# Patient Record
Sex: MALE | Race: WHITE
[De-identification: names, ages, dates, MRNs, and addresses within clinical notes are randomized per-mention and may not be internally consistent; named-entity substitution may affect disease eponyms.]

---

## 2018-07-24 ENCOUNTER — HOSPITAL ENCOUNTER (EMERGENCY)
Dept: HOSPITAL 25 - ED | Age: 48
Discharge: HOME | End: 2018-07-24
Payer: COMMERCIAL

## 2018-07-24 VITALS — DIASTOLIC BLOOD PRESSURE: 78 MMHG | SYSTOLIC BLOOD PRESSURE: 125 MMHG

## 2018-07-24 DIAGNOSIS — Z88.3: ICD-10-CM

## 2018-07-24 DIAGNOSIS — R55: Primary | ICD-10-CM

## 2018-07-24 DIAGNOSIS — Z86.73: ICD-10-CM

## 2018-07-24 LAB
BASOPHILS # BLD AUTO: 0 10^3/UL (ref 0–0.2)
EOSINOPHIL # BLD AUTO: 0.1 10^3/UL (ref 0–0.6)
HCT VFR BLD AUTO: 36 % (ref 42–52)
HGB BLD-MCNC: 12.4 G/DL (ref 14–18)
LYMPHOCYTES # BLD AUTO: 1 10^3/UL (ref 1–4.8)
MCH RBC QN AUTO: 29 PG (ref 27–31)
MCHC RBC AUTO-ENTMCNC: 35 G/DL (ref 31–36)
MCV RBC AUTO: 83 FL (ref 80–94)
MONOCYTES # BLD AUTO: 0.7 10^3/UL (ref 0–0.8)
NEUTROPHILS # BLD AUTO: 6.6 10^3/UL (ref 1.5–7.7)
NRBC # BLD AUTO: 0 10^3/UL
NRBC BLD QL AUTO: 0
PLATELET # BLD AUTO: 167 10^3/UL (ref 150–450)
RBC # BLD AUTO: 4.32 10^6/UL (ref 4–5.4)
WBC # BLD AUTO: 8.4 10^3/UL (ref 3.5–10.8)

## 2018-07-24 PROCEDURE — 80053 COMPREHEN METABOLIC PANEL: CPT

## 2018-07-24 PROCEDURE — 86141 C-REACTIVE PROTEIN HS: CPT

## 2018-07-24 PROCEDURE — 93005 ELECTROCARDIOGRAM TRACING: CPT

## 2018-07-24 PROCEDURE — 83605 ASSAY OF LACTIC ACID: CPT

## 2018-07-24 PROCEDURE — 81003 URINALYSIS AUTO W/O SCOPE: CPT

## 2018-07-24 PROCEDURE — 85652 RBC SED RATE AUTOMATED: CPT

## 2018-07-24 PROCEDURE — 84443 ASSAY THYROID STIM HORMONE: CPT

## 2018-07-24 PROCEDURE — 84484 ASSAY OF TROPONIN QUANT: CPT

## 2018-07-24 PROCEDURE — 36415 COLL VENOUS BLD VENIPUNCTURE: CPT

## 2018-07-24 PROCEDURE — 85025 COMPLETE CBC W/AUTO DIFF WBC: CPT

## 2018-07-24 PROCEDURE — 99283 EMERGENCY DEPT VISIT LOW MDM: CPT

## 2018-07-24 PROCEDURE — 71045 X-RAY EXAM CHEST 1 VIEW: CPT

## 2018-07-24 PROCEDURE — 83735 ASSAY OF MAGNESIUM: CPT

## 2018-07-24 PROCEDURE — 96374 THER/PROPH/DIAG INJ IV PUSH: CPT

## 2018-07-24 NOTE — ED
Progress





- Progress Note


Progress Note: 


This patient was signed out from Dr. Lora to Dr. Siegel awaiting re-eval. 


Re-eval was done at 21:39. The patient is feeling better and will be discharged 

home with dx syncope and instructions to follow up with his dermatologist for 

his knee and his PCP.





Re-Evaluation





- Re-Evaluation


  ** First Eval


Re-Evaluation Time: 18:57


Change: Improved - Right knee is mildly warm. 2 liters of fluid pending. He 

feels better. Plan for re-eval by Dr. Siegel





Course/Dx





- Diagnoses


Provider Diagnoses: 


 Syncope








Discharge





- Sign-Out/Discharge


Documenting (check all that apply): Patient Departure





- Discharge Plan


Condition: Stable


Disposition: HOME


Patient Education Materials:  Syncope (ED)


Referrals: 


Tobias Cooper MD [Primary Care Provider] -  (Follow up with your 

dermatologist for your knee and your primary care physician in 1-3 days.)


Additional Instructions: 


RETURN TO THE EMERGENCY DEPARTMENT FOR CHANGING OR WORSENING SYMPTOMS.

## 2018-07-24 NOTE — RAD
INDICATION:  Syncope.



COMPARISON:  Comparison is made with prior chest x-ray study from November 21, 2016.



TECHNIQUE: 2 portable films of the chest were obtained.



FINDINGS: Cardiac and mediastinal contours appear to be within normal limits.



The lungs are clear. No pleural effusion is seen.



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

prosthesis.



IMPRESSION:  NO EVIDENCE FOR ACUTE DISEASE.
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
## 2018-07-28 ENCOUNTER — HOSPITAL ENCOUNTER (INPATIENT)
Dept: HOSPITAL 25 - ED | Age: 48
LOS: 2 days | Discharge: HOME | DRG: 313 | End: 2018-07-30
Attending: HOSPITALIST | Admitting: INTERNAL MEDICINE
Payer: COMMERCIAL

## 2018-07-28 DIAGNOSIS — F41.9: ICD-10-CM

## 2018-07-28 DIAGNOSIS — I10: ICD-10-CM

## 2018-07-28 DIAGNOSIS — Z80.0: ICD-10-CM

## 2018-07-28 DIAGNOSIS — M00.061: Primary | ICD-10-CM

## 2018-07-28 DIAGNOSIS — Z88.2: ICD-10-CM

## 2018-07-28 DIAGNOSIS — G43.909: ICD-10-CM

## 2018-07-28 DIAGNOSIS — Z96.619: ICD-10-CM

## 2018-07-28 DIAGNOSIS — Z88.6: ICD-10-CM

## 2018-07-28 DIAGNOSIS — B95.62: ICD-10-CM

## 2018-07-28 DIAGNOSIS — F17.220: ICD-10-CM

## 2018-07-28 DIAGNOSIS — L40.50: ICD-10-CM

## 2018-07-28 DIAGNOSIS — I73.00: ICD-10-CM

## 2018-07-28 DIAGNOSIS — D84.9: ICD-10-CM

## 2018-07-28 DIAGNOSIS — Z79.899: ICD-10-CM

## 2018-07-28 DIAGNOSIS — G47.00: ICD-10-CM

## 2018-07-28 DIAGNOSIS — K21.9: ICD-10-CM

## 2018-07-28 DIAGNOSIS — Z82.49: ICD-10-CM

## 2018-07-28 DIAGNOSIS — L40.9: ICD-10-CM

## 2018-07-28 LAB
BASOPHILS # BLD AUTO: 0.1 10^3/UL (ref 0–0.2)
EOSINOPHIL # BLD AUTO: 0.3 10^3/UL (ref 0–0.6)
HCT VFR BLD AUTO: 37 % (ref 42–52)
HGB BLD-MCNC: 12.8 G/DL (ref 14–18)
LYMPHOCYTES # BLD AUTO: 1.2 10^3/UL (ref 1–4.8)
MCH RBC QN AUTO: 29 PG (ref 27–31)
MCHC RBC AUTO-ENTMCNC: 34 G/DL (ref 31–36)
MCV RBC AUTO: 84 FL (ref 80–94)
MONOCYTES # BLD AUTO: 0.4 10^3/UL (ref 0–0.8)
NEUTROPHILS # BLD AUTO: 3.4 10^3/UL (ref 1.5–7.7)
NRBC # BLD AUTO: 0 10^3/UL
NRBC BLD QL AUTO: 0
PLATELET # BLD AUTO: 234 10^3/UL (ref 150–450)
RBC # BLD AUTO: 4.42 10^6/UL (ref 4–5.4)
URATE SERPL-MCNC: 4.6 MG/DL (ref 4.4–7.6)
WBC # BLD AUTO: 5.4 10^3/UL (ref 3.5–10.8)

## 2018-07-28 PROCEDURE — 36415 COLL VENOUS BLD VENIPUNCTURE: CPT

## 2018-07-28 PROCEDURE — 84520 ASSAY OF UREA NITROGEN: CPT

## 2018-07-28 PROCEDURE — C1751 CATH, INF, PER/CENT/MIDLINE: HCPCS

## 2018-07-28 PROCEDURE — 89051 BODY FLUID CELL COUNT: CPT

## 2018-07-28 PROCEDURE — 87640 STAPH A DNA AMP PROBE: CPT

## 2018-07-28 PROCEDURE — 0S9C4ZZ DRAINAGE OF RIGHT KNEE JOINT, PERCUTANEOUS ENDOSCOPIC APPROACH: ICD-10-PCS | Performed by: ORTHOPAEDIC SURGERY

## 2018-07-28 PROCEDURE — 87641 MR-STAPH DNA AMP PROBE: CPT

## 2018-07-28 PROCEDURE — 87205 SMEAR GRAM STAIN: CPT

## 2018-07-28 PROCEDURE — 87073 CULTURE BACTERIA ANAEROBIC: CPT

## 2018-07-28 PROCEDURE — 87186 SC STD MICRODIL/AGAR DIL: CPT

## 2018-07-28 PROCEDURE — 87070 CULTURE OTHR SPECIMN AEROBIC: CPT

## 2018-07-28 PROCEDURE — 89060 EXAM SYNOVIAL FLUID CRYSTALS: CPT

## 2018-07-28 PROCEDURE — 81015 MICROSCOPIC EXAM OF URINE: CPT

## 2018-07-28 PROCEDURE — 87077 CULTURE AEROBIC IDENTIFY: CPT

## 2018-07-28 PROCEDURE — 80202 ASSAY OF VANCOMYCIN: CPT

## 2018-07-28 PROCEDURE — 85025 COMPLETE CBC W/AUTO DIFF WBC: CPT

## 2018-07-28 PROCEDURE — 99284 EMERGENCY DEPT VISIT MOD MDM: CPT

## 2018-07-28 PROCEDURE — 81003 URINALYSIS AUTO W/O SCOPE: CPT

## 2018-07-28 PROCEDURE — 84550 ASSAY OF BLOOD/URIC ACID: CPT

## 2018-07-28 PROCEDURE — 83605 ASSAY OF LACTIC ACID: CPT

## 2018-07-28 PROCEDURE — 80048 BASIC METABOLIC PNL TOTAL CA: CPT

## 2018-07-28 PROCEDURE — 0SBC4ZZ EXCISION OF RIGHT KNEE JOINT, PERCUTANEOUS ENDOSCOPIC APPROACH: ICD-10-PCS | Performed by: ORTHOPAEDIC SURGERY

## 2018-07-28 PROCEDURE — 87798 DETECT AGENT NOS DNA AMP: CPT

## 2018-07-28 PROCEDURE — 87040 BLOOD CULTURE FOR BACTERIA: CPT

## 2018-07-28 PROCEDURE — 87476 LYME DIS DNA AMP PROBE: CPT

## 2018-07-28 PROCEDURE — 84145 PROCALCITONIN (PCT): CPT

## 2018-07-28 PROCEDURE — 85652 RBC SED RATE AUTOMATED: CPT

## 2018-07-28 PROCEDURE — 80053 COMPREHEN METABOLIC PANEL: CPT

## 2018-07-28 PROCEDURE — 86140 C-REACTIVE PROTEIN: CPT

## 2018-07-28 PROCEDURE — 82565 ASSAY OF CREATININE: CPT

## 2018-07-28 PROCEDURE — 83735 ASSAY OF MAGNESIUM: CPT

## 2018-07-28 RX ADMIN — HEPARIN SODIUM SCH: 5000 INJECTION INTRAVENOUS; SUBCUTANEOUS at 21:35

## 2018-07-28 NOTE — XMS REPORT
Yousuf Wagoner

 Created on:2018



Patient:Yousuf Wagoner

Sex:Male

:1970

External Reference #:[id number]





Demographics







 Address  [address] [place] [address]

 

 Home Phone  [phone]

 

 Mobile Phone  [phone]

 

 Preferred Language  English

 

 Marital Status  Not  Or 

 

 Moravian Affiliation  Unknown

 

 Race  White

 

 Ethnic Group  Not  Or 









Author







 Organization  [facility]

 

 Address  [address]



   [place] [address]

 

 Phone  [phone]









Support







 Name  Relationship  Address  Phone

 

 Kelin Wagoner  Unavailable  Unavailable  [phone]









Care Team Providers







 Name  Role  Phone

 

 Oral Horvath MD  Care Team Information   Unavailable

 

 Tobias Cooper MD  Primary Care Physician  Unavailable









Payers







 Type  Date  Identification Numbers  Payment Provider  Subscriber

 

 Commercial    Policy Number: [id number]  [employer]  Kelin Wagoner









 PayID: [id number]  [address] [id number]









 [place] [address]







Problems







 Date  Description  Provider  Status

 

 Onset: 2018  Cellulitis of right lower limb  Haley Landry M.D.  Active

 

 Onset: 2018  Localized, primary osteoarthritis  Haley Landry M.D.  
Active

 

 Onset: 2018  Psoriasis with arthropathy  Haley Landry M.D.  Active







Family History







 Date  Family Member(s)  Problem(s)  Comments

 

   General  Arthritis, Osteo  

 

   General  Osteoarthritis  

 

   General  Brother with undifferentiated spondyloarthropathy  

 

   General  Hypertension  







Social History







 Type  Date  Description  Comments

 

 Lives With    Spouse  

 

 Occupation    [employer]  

 

 ETOH Use    Denies alcohol use  

 

 Smoking    Patient has never smoked  

 

 Smoking    chews tobacco  

 

 Exercise Type/Frequency    Exercises rarely  







Allergies, Adverse Reactions, Alerts







 Date  Description  Reaction  Status  Severity  Comments

 

 2017  Tylenol    active    heartburn/burning sensation

 

 2017  Aspirin    active    heartburn/burning sensation

 

 2018  Sulfa Antibiotics  rash  active    

 

 2018  Tape    active    







Medications







 Medication  Date  Status  Form  Strength  Qnty  SIG  Indications  Ordering



                 Provider

 

 Doxycycline  /  Active  Capsules  100mg  45cap  1 by mouth    German Cortez  2018        s  twice a day,    darlin Cox M.D.



             Methotrexate    



             and Taltz    

 

 Meloxicam  /  Active  Tablets  15mg  30tab  1 by mouth  M25.561  Haley



   2018        s  every day    AMAURY Landry

 

 Hydrocodone  /  Active  Tablets  5-300mg  60tab  1-2 tablets  M25.561  
Haley



 Bitartrate/Ace  2018        s  q12 hours by    soha Landryinophen            mouth as    M.NIKI



             needed pain    

 

 Cephalexin  /  Active  Tablets  500mg  30tab  1 by mouth  L03.115  Haley



   2018        s  four times a    chris Landry M.D.

 

 Potassium  /  Active  Tablets ER  20Meq  6tabs  1 tab po bid  E87.6  
Haley



 Chloride ER  2018          x 3 days    Frantz,



             **recheck    M.D.



             potassium    



             wtih pcp in 1    



             week    

 

 Taltz  /  Active  Solution  80mg/ml  8unit  160 mg (two    2018    Auto-Injec    s  80 mg    Brooke,



       t      injections)    MBALDEMAR



             at week 0,    



             followed by    



             80 mg at    



             weeks 2, 4,    



             6, 8, 10, and    



             12, then 80    



             mg every 4    



             weeks.    

 

 Methotrexate  /  Active  Tablets  2.5mg  30tab  take 5  R70.0  2018        s  capsules/tabl    Brooke,



             ets by mouth    M.DDeidra



             once weekly    

 

 Folic Acid  /  Active  Tablets  1mg  90tab  take one  R70.0  2018        s  capsule/table    Brooke,



             t daily by    MBALDEMAR



             mouth    

 

 Celebrex  /  Active  Capsules  200mg  90cap  take one  M06.4  2017        s  capsule/table    Brooke,



             t daily by    M.NIKI



             mouth as    



             needed for    



             pain    

 

 Nitro-bid  /  Active  Ointment  2%  30gm  apply small  I73.00  2017          amount to    xiang Cox of    M.D.



             digits as    



             needed for    



             attack of    



             raynaud's    

 

 D3-1000  /  Active  Tablets  1000Unit  90tab  take one  M06.4  2017        s  capsule/table    Brooke,



             t daily by    M.NIKI



             mouth    

 

 Venlafaxine  /  Active  Caps ER  150mg    1 by mouth    Unknown



 HCL ER  0000    24HR      every day    

 

 Pantoprazole  00//  Active  Tablets DR  40mg    1 by mouth    Unknown



 Sodium  0000          every day    

 

 Losartan  00/  Active  Tablets  100mg    1 by mouth    Unknown



 Potassium  0000          every day    

 

 Amlodipine  00/  Active  Tablets  5mg    1 by mouth    Unknown



 Besylate  0000          every day    

 

 Trazodone HCL  /  Active  Tablets  50mg    1 tablet at    Unknown



   0000          bedtime as    



             needed    

 

 Alprazolam  /  Active  Tablets  0.5mg        Unknown



   0000              

 

 Rizatriptan  /  Active  Tablets  10mg    prn    Unknown



 Benzoate  0000    Dispers          

 

 Aspir-81  /  Active  Tablets DR  81mg    1 by mouth    Unknown



   0000          every day    

 

                 

 

 Clopidogrel  /  Hx  Tablets  75mg    1 by mouth    Unknown



 Bisulfate  0000 -          every day    



   2018              







Immunizations







 CPT Code  Status  Date  Vaccine  Reaction  Lot #

 

 [id number]  Given  2018  Pneumococcal Conjugate  no immediate reaction  [id number]



       Vaccine 13 Valent For  noted  



       Intramuscular Use    







Vital Signs







 Date  Vital  Result  Comment

 

 2018  Height  70 inches  5'10"









 Weight  235.00 lb  

 

 Heart Rate  88 /min  

 

 BP Systolic  126 mmHg  

 

 BP Diastolic  84 mmHg  

 

 BMI (Body Mass Index)  33.7 kg/m2  









 2018  Height  70 inches  5'10"









 Heart Rate  97 /min  

 

 BP Systolic Sitting  124 mmHg  

 

 BP Diastolic Sitting  73 mmHg  

 

 Respiratory Rate  14 /min  

 

 Body Temperature  99.0 F  

 

 Pain Level  8  









 2018  Height  70 inches  5'10"









 Weight  239.12 lb  

 

 Heart Rate  73 /min  

 

 BP Systolic Sitting  130 mmHg  

 

 BP Diastolic Sitting  82 mmHg  

 

 Respiratory Rate  14 /min  

 

 Pain Level  5  

 

 BMI (Body Mass Index)  34.3 kg/m2  









 2018  Height  70 inches  5'10"









 Weight  239.00 lb  

 

 Heart Rate  84 /min  

 

 BP Systolic Sitting  120 mmHg  

 

 BP Diastolic Sitting  80 mmHg  

 

 Respiratory Rate  14 /min  

 

 Body Temperature  97.6 F  

 

 Pain Level  5  

 

 BMI (Body Mass Index)  34.3 kg/m2  









 2017  Height  70 inches  5'10"









 Weight  237.25 lb  

 

 Heart Rate  80 /min  

 

 BP Systolic Sitting  118 mmHg  

 

 BP Diastolic Sitting  78 mmHg  

 

 Respiratory Rate  14 /min  

 

 Pain Level  3  

 

 BMI (Body Mass Index)  34.0 kg/m2  









 2017  Height  70 inches  5'10"









 Weight  238.00 lb  

 

 Heart Rate  89 /min  

 

 BP Systolic Sitting  101 mmHg  

 

 BP Diastolic Sitting  73 mmHg  

 

 Respiratory Rate  14 /min  

 

 Body Temperature  97.1 F  

 

 Pain Level  4  

 

 BMI (Body Mass Index)  34.1 kg/m2  









 2017  Height  70 inches  5'10"









 Weight  242.00 lb  

 

 Heart Rate  68 /min  

 

 BP Systolic Sitting  122 mmHg  

 

 BP Diastolic Sitting  80 mmHg  

 

 Respiratory Rate  14 /min  

 

 Body Temperature  97.4 F  

 

 Pain Level  3  

 

 BMI (Body Mass Index)  34.7 kg/m2  







Results







 Test  Date  Test  Result  H/L  Range  Note

 

 Quantiferon Gold TB  2018  QuantiFERON-Tb Gold Plus  Negative    
Negative  1









 TB1 Ag minus Nil Result  0 IU/mL      

 

 TB2 Ag minus Nil Result  0 IU/mL      

 

 TB Mitogen minus Nil Result  9.05 IU/mL      

 

 TB Nil Result  0.03 IU/mL      2









 Laboratory test finding  2018  C Reactive Protein  2.93 mg/L    <8.01  3

 

 Comp Metabolic Panel  2018  Sodium  138 mmol/L    135-145  









 Potassium  4.2 mmol/L    3.5-5.0  

 

 Chloride  104 mmol/L    101-111  

 

 Co2 Carbon Dioxide  29 mmol/L    22-32  

 

 Anion Gap  5 mmol/L    2-11  

 

 Glucose  103 mg/dL  High    

 

 Blood Urea Nitrogen  21 mg/dL    6-24  

 

 Creatinine  1.11 mg/dL    0.67-1.17  

 

 BUN/Creatinine Ratio  18.9    8-20  

 

 Calcium  9.2 mg/dL    8.6-10.3  

 

 Total Protein  6.7 g/dL    6.4-8.9  

 

 Albumin  4.3 g/dL    3.2-5.2  

 

 Globulin  2.4 g/dL    2-4  

 

 Albumin/Globulin Ratio  1.8    1-3  

 

 Total Bilirubin  0.40 mg/dL    0.2-1.0  

 

 Alkaline Phosphatase  76 U/L      

 

 Alt  16 U/L    7-52  

 

 Ast  15 U/L    13-39  

 

 Egfr Non-  70.4    >60  

 

 Egfr   85.2    >60  4









 Laboratory test finding  2018  Erythrocyte Sed Rate  7 mm/Hr    0-14  

 

 CBC Auto Diff  2018  White Blood Count  7.0 10^3/uL    3.5-10.8  









 Red Blood Count  4.95 10^6/uL    4.00-5.40  

 

 Hemoglobin  14.4 g/dL    14.0-18.0  

 

 Hematocrit  41 %  Low  42-52  

 

 Mean Corpuscular Volume  83 fL    80-94  

 

 Mean Corpuscular Hemoglobin  29 pg    27-31  

 

 Mean Corpuscular HGB Conc  35 g/dL    31-36  

 

 Red Cell Distribution Width  14 %    10.5-15  

 

 Platelet Count  212 10^3/uL    150-450  

 

 Mean Platelet Volume  7.5 um3    7.4-10.4  

 

 Abs Neutrophils  4.3 10^3/uL    1.5-7.7  

 

 Abs Lymphocytes  1.7 10^3/uL    1.0-4.8  

 

 Abs Monocytes  0.6 10^3/uL    0-0.8  

 

 Abs Eosinophils  0.2 10^3/uL    0-0.6  

 

 Abs Basophils  0.1 10^3/uL    0-0.2  

 

 Abs Nucleated RBC  0 10^3/uL      

 

 Granulocyte %  62.4 %    38-83  

 

 Lymphocyte %  25.0 %    25-47  

 

 Monocyte %  8.3 %  High  0-7  

 

 Eosinophil %  3.3 %    0-6  

 

 Basophil %  1.0 %    0-2  

 

 Nucleated Red Blood Cells %  0      









 Laboratory test finding  2018  Erythrocyte Sed Rate  10 mm/Hr    0-14  5









 C Reactive Protein  6.40 mg/L  High  < 5.00  6

 

 Vitamin D, 1,25 Dihydroxy  52 pg/mL    18-64  7









 Comp Metabolic Panel  2018  Sodium  138 mmol/L  Low  139-145  









 Potassium  3.9 mmol/L    3.5-5.0  

 

 Chloride  104 mmol/L    101-111  

 

 Co2 Carbon Dioxide  27 mmol/L    22-32  

 

 Anion Gap  7 mmol/L    2-11  

 

 Glucose  130 mg/dL  High    

 

 Blood Urea Nitrogen  22 mg/dL    6-24  

 

 Creatinine  1.01 mg/dL    0.67-1.17  

 

 BUN/Creatinine Ratio  21.8  High  8-20  

 

 Calcium  9.2 mg/dL    8.6-10.3  

 

 Total Protein  6.3 g/dL  Low  6.4-8.9  

 

 Albumin  4.0 g/dL    3.2-5.2  

 

 Globulin  2.3 g/dL    2-4  

 

 Albumin/Globulin Ratio  1.7    1-3  

 

 Total Bilirubin  0.40 mg/dL    0.2-1.0  

 

 Alkaline Phosphatase  96 U/L      

 

 Alt  17 U/L    7-52  

 

 Ast  18 U/L    13-39  

 

 Egfr Non-  78.5    >60  

 

 Egfr   101.0    >60  8









 CBC Auto Diff  2018  White Blood Count  5.6 10^3/uL    3.5-10.8  









 Red Blood Count  5.00 10^6/uL    4.0-5.4  

 

 Hemoglobin  13.9 g/dL  Low  14.0-18.0  

 

 Hematocrit  41 %  Low  42-52  

 

 Mean Corpuscular Volume  83 fL    80-94  

 

 Mean Corpuscular Hemoglobin  28 pg    27-31  

 

 Mean Corpuscular HGB Conc  34 g/dL    31-36  

 

 Red Cell Distribution Width  14 %    10.5-15  

 

 Platelet Count  225 10^3/uL    150-450  

 

 Mean Platelet Volume  8.2 um3    7.4-10.4  

 

 Abs Neutrophils  3.6 10^3/uL    1.5-7.7  

 

 Abs Lymphocytes  1.4 10^3/uL    1.0-4.8  

 

 Abs Monocytes  0.3 10^3/uL    0-0.8  

 

 Abs Eosinophils  0.2 10^3/uL    0-0.6  

 

 Abs Basophils  0 10^3/uL    0-0.2  

 

 Abs Nucleated RBC  0 10^3/uL      

 

 Granulocyte %  64.5 %    38-83  

 

 Lymphocyte %  25.4 %    25-47  

 

 Monocyte %  5.8 %    0-7  

 

 Eosinophil %  3.7 %    0-6  

 

 Basophil %  0.6 %    0-2  

 

 Nucleated Red Blood Cells %  0.1      









 Comp Metabolic Panel  2017  Sodium  136 mmol/L    133-145  









 Potassium  4.7 mmol/L    3.5-5.0  

 

 Chloride  101 mmol/L    101-111  

 

 Co2 Carbon Dioxide  29 mmol/L    22-32  

 

 Anion Gap  6 mmol/L    2-11  

 

 Glucose  80 mg/dL      

 

 Blood Urea Nitrogen  17 mg/dL    6-24  

 

 Creatinine  1.15 mg/dL    0.67-1.17  

 

 BUN/Creatinine Ratio  14.8    8-20  

 

 Calcium  9.9 mg/dL    8.6-10.3  

 

 Total Protein  7.0 g/dL    6.4-8.9  

 

 Albumin  4.5 g/dL    3.2-5.2  

 

 Globulin  2.5 g/dL    2-4  

 

 Albumin/Globulin Ratio  1.8    1-3  

 

 Total Bilirubin  0.60 mg/dL    0.2-1.0  

 

 Alkaline Phosphatase  80 U/L      

 

 Alt  24 U/L    7-52  

 

 Ast  20 U/L    13-39  

 

 Egfr Non-  67.9    >60  

 

 Egfr   87.3    >60  9









 Laboratory test finding  2017  Erythrocyte Sed Rate  12 mm/Hr    0-14  10









 C Reactive Protein  3.36 mg/L    < 5.00  11









 Vitamin B12 And Folate Serum  2017  Vitamin B12  533 pg/mL    180-914  12









 Folic Acid (Folate)  7.97 ng/mL    >3.99  13









 Iron & Iron Binding Capacity  2017  Iron  97 g/dL      









 Unsaturated Iron Binding  292 g/dL      

 

 Total Iron Binding Capacity  389 g/dL    250-450  

 

 % Iron Saturation  25 %    15-55  









 CBC Auto Diff  2017  White Blood Count  5.0 10^3/uL    3.5-10.8  









 Red Blood Count  5.34 10^6/uL    4.0-5.4  

 

 Hemoglobin  14.1 g/dL    14.0-18.0  

 

 Hematocrit  43 %    42-52  

 

 Mean Corpuscular Volume  81 fL    80-94  

 

 Mean Corpuscular Hemoglobin  26 pg  Low  27-31  

 

 Mean Corpuscular HGB Conc  33 g/dL    31-36  

 

 Red Cell Distribution Width  15 %    10.5-15  

 

 Platelet Count  213 10^3/uL    150-450  

 

 Mean Platelet Volume  8 um3    7.4-10.4  

 

 Abs Neutrophils  3.1 10^3/uL    1.5-7.7  

 

 Abs Lymphocytes  1.4 10^3/uL    1.0-4.8  

 

 Abs Monocytes  0.4 10^3/uL    0-0.8  

 

 Abs Eosinophils  0.1 10^3/uL    0-0.6  

 

 Abs Basophils  0 10^3/uL    0-0.2  

 

 Abs Nucleated RBC  0.01 10^3/uL      

 

 Granulocyte %  61.5 %    38-83  

 

 Lymphocyte %  27.2 %    25-47  

 

 Monocyte %  8.9 %    1-9  

 

 Eosinophil %  1.8 %    0-6  

 

 Basophil %  0.6 %    0-2  

 

 Nucleated Red Blood Cells %  0.1      









 Connective Tissue Panel  2017  Anti-Nuclear Antibody  0.5 U      14









 Cyclic Citrullinated Peptide  <15.6 U      15

 

 Interpretation  See Comment      16









 Laboratory test finding  2017  Rheumatoid Factor  <15 IU/mL    <15  17









 Erythrocyte Sed Rate  23 mm/Hr  High  0-14  18

 

 C Reactive Protein  14.86 mg/L  High  < 5.00  19









 Hla B27  2017  Hla B27  Negative      20









 Hla B27 Interp  See Comment      21









 Laboratory test finding  2017  Lyme Disease Serology  Negative    
Negative  22

 

 Anca AB Ser If  2017  C-Anca  Negative    Negative  









 P-Anca  Negative    Negative  23









 Laboratory test finding  2017  Creatine Kinase(CK)  132 U/L      24

 

 Comp Metabolic Panel  2017  Sodium  139 mmol/L    133-145  









 Potassium  4.3 mmol/L    3.5-5.0  

 

 Chloride  103 mmol/L    101-111  

 

 Co2 Carbon Dioxide  30 mmol/L    22-32  

 

 Anion Gap  6 mmol/L    2-11  

 

 Glucose  99 mg/dL      

 

 Blood Urea Nitrogen  21 mg/dL    6-24  

 

 Creatinine  1.08 mg/dL    0.67-1.17  

 

 BUN/Creatinine Ratio  19.4    8-20  

 

 Calcium  9.4 mg/dL    8.6-10.3  

 

 Total Protein  7.0 g/dL    6.4-8.9  

 

 Albumin  4.2 g/dL    3.2-5.2  

 

 Globulin  2.8 g/dL    2-4  

 

 Albumin/Globulin Ratio  1.5    1-3  

 

 Total Bilirubin  0.40 mg/dL    0.2-1.0  

 

 Alkaline Phosphatase  84 U/L      

 

 Alt  18 U/L    7-52  

 

 Ast  14 U/L    13-39  

 

 Egfr Non-  73.3    >60  

 

 Egfr   94.3    >60  25









 CBC Auto Diff  2017  White Blood Count  5.5 10^3/uL    3.5-10.8  









 Red Blood Count  5.12 10^6/uL    4.0-5.4  

 

 Hemoglobin  13.4 g/dL  Low  14.0-18.0  

 

 Hematocrit  41 %  Low  42-52  

 

 Mean Corpuscular Volume  79 fL  Low  80-94  

 

 Mean Corpuscular Hemoglobin  26 pg  Low  27-31  

 

 Mean Corpuscular HGB Conc  33 g/dL    31-36  

 

 Red Cell Distribution Width  15 %    10.5-15  

 

 Platelet Count  226 10^3/uL    150-450  

 

 Mean Platelet Volume  8 um3    7.4-10.4  

 

 Abs Neutrophils  4.0 10^3/uL    1.5-7.7  

 

 Abs Lymphocytes  1.1 10^3/uL    1.0-4.8  

 

 Abs Monocytes  0.3 10^3/uL    0-0.8  

 

 Abs Eosinophils  0.1 10^3/uL    0-0.6  

 

 Abs Basophils  0 10^3/uL    0-0.2  

 

 Abs Nucleated RBC  0 10^3/uL      

 

 Granulocyte %  72.7 %    38-83  

 

 Lymphocyte %  19.8 %  Low  25-47  

 

 Monocyte %  5.3 %    1-9  

 

 Eosinophil %  1.8 %    0-6  

 

 Basophil %  0.4 %    0-2  

 

 Nucleated Red Blood Cells %  0.1      









 Laboratory test finding  2017  TSH (Thyroid Stim  1.00 mcIU/mL    0.34-
5.60  26



     Horm)        









 Angiotensin Converting Enzyme  27 U/L    8 - 53  27









 Vitamin B12 And Folate Serum  2017  Vitamin B12  531 pg/mL    180-914  28









 Folic Acid (Folate)  5.25 ng/mL    >3.99  29









 Vitamin D 1,25 And Vitamin  2017  Vitamin D Total 25(Oh)  29.6 ng/mL  
Low  30-50  30



 D,2            









 Vitamin D, 1,25 Dihydroxy  56 pg/mL    18-64  31









 1  No interferon-gamma response to M. tuberculosis antigens



   was detected. Infection with M. tuberculosis is unlikely.



   A single negative result does not exclude infection with



   M. tuberculosis. In patients at high risk for M.tuberculosis



   infection, a second test should be considered in accordance



   with the 2017 ATS/IDSA/CDC Clinical Practice Guidelines



   for Diagnosis of Tuberculosis in Adults and Children



   [Lewinsohn DM et. al. Clin. Infect. Dis.



   2017;64(2):111-115].

 

 2  Test Performed by:



   [facility] [place] [address]

 

 3  Please check this week

 

 4  *******Because ethnic data is not always readily available,



   this report includes an eGFR for both -Americans and



   non- Americans.****



   The National Kidney Disease Education Program (NKDEP) does



   not endorse the use of the MDRD equation for patients that



   are not between the ages of 18 and 70, are pregnant, have



   extremes of body size, muscle mass, or nutritional status,



   or are non- or non-.



   According to the National Kidney Foundation, irrespective of



   diagnosis, the stage of the disease is based on the level of



   kidney function:



   Stage Description                      GFR(mL/min/1.73 m(2))



   1     Kidney damage with normal or decreased GFR       90



   2     Kidney damage with mild decrease in GFR          60-89



   3     Moderate decrease in GFR                         30-59



   4     Severe decrease in GFR                           15-29



   5     Kidney failure                       <15 (or dialysis)

 

 5  Please check today

 

 6  Acute inflammation:  >10.00

 

 7  -------------------ADDITIONAL INFORMATION-------------------



   This test was developed and its performance characteristics



   determined by [facility] in a manner consistent with CLIA



   requirements. This test has not been cleared or approved by



   the U.S. Food and Drug Administration.



   Test Performed by:



   [facility] [facility] [place] [address]

 

 8  *******Because ethnic data is not always readily available,



   this report includes an eGFR for both -Americans and



   non- Americans.****



   The National Kidney Disease Education Program (NKDEP) does



   not endorse the use of the MDRD equation for patients that



   are not between the ages of 18 and 70, are pregnant, have



   extremes of body size, muscle mass, or nutritional status,



   or are non- or non-.



   According to the National Kidney Foundation, irrespective of



   diagnosis, the stage of the disease is based on the level of



   kidney function:



   Stage Description                      GFR(mL/min/1.73 m(2))



   1     Kidney damage with normal or decreased GFR       90



   2     Kidney damage with mild decrease in GFR          60-89



   3     Moderate decrease in GFR                         30-59



   4     Severe decrease in GFR                           15-29



   5     Kidney failure                       <15 (or dialysis)

 

 9  *******Because ethnic data is not always readily available,



   this report includes an eGFR for both -Americans and



   non- Americans.****



   The National Kidney Disease Education Program (NKDEP) does



   not endorse the use of the MDRD equation for patients that



   are not between the ages of 18 and 70, are pregnant, have



   extremes of body size, muscle mass, or nutritional status,



   or are non- or non-.



   According to the National Kidney Foundation, irrespective of



   diagnosis, the stage of the disease is based on the level of



   kidney function:



   Stage Description                      GFR(mL/min/1.73 m(2))



   1     Kidney damage with normal or decreased GFR       90



   2     Kidney damage with mild decrease in GFR          60-89



   3     Moderate decrease in GFR                         30-59



   4     Severe decrease in GFR                           15-29



   5     Kidney failure                       <15 (or dialysis)

 

 10  Please check in 1 or 2 weeks or sooner if needed

 

 11  Acute inflammation:  >10.00

 

 12  Normal Range 180 to 914



   Indeterminate Range 145 to 180



   Deficient Range  <145

 

 13  Please check in 1 or 2 weeks or sooner if needed

 

 14  -------------------REFERENCE VALUE--------------------------



   <=1.0 (Negative)

 

 15  -------------------REFERENCE VALUE--------------------------



   <20.0 (Negative)

 

 16  Tests for antibodies to dsDNA and JULISA antigens are not



   performed automatically unless the ALISON result is > or=



   3.0 U.  Studies performed at [facility] indicate that



   positive ALISON results <3.0 U are rarely accompanied by



   positive second order tests.



   Test Performed by:



   [facility] [place] [address]



   : Jeremiah Hernandez II, M.D., Ph.D.

 

 17  Test Performed by:



   [facility] [place] [address]



   : Jeremiah Hernandez II, M.D., Ph.D.

 

 18  Please check today

 

 19  Acute inflammation:  >10.00

 

 20  -------------------REFERENCE VALUE--------------------------



   Not Applicable

 

 21  RESULT: HLA-B27 antigen was not detected.



   -------------------ADDITIONAL INFORMATION-------------------



   Method: Flow Cytometry



   Performing Laboratory CLIA# [id number]



   Test Performed by:



   [place] [address]



   : Jeremiah Hernandez II, M.D., Ph.D.

 

 22  Serologic response to B. burgdorferi infection is not



   detected, but cannot rule out early infection during



   which low or undetectable antibody levels to



   B. burgdorferi may be present. If clinically indicated,



   a new serum specimen should be submitted in 7-14 days.



   Test Performed by:



   [facility] [place] [address]



   : Jeremiah Hernandez II, M.D., Ph.D.

 

 23  Negative for cANCA and pANCA patterns by immunofluorescence.



   -------------------ADDITIONAL INFORMATION-------------------



   This test was developed and its performance characteristics



   determined by [facility] in a manner consistent with CLIA



   requirements. This test has not been cleared or approved by



   the U.S. Food and Drug Administration.



   Test Performed by:



   [place] [address]



   : Jeremiah Hernandez II, M.D., Ph.D.

 

 24  Please check today

 

 25  *******Because ethnic data is not always readily available,



   this report includes an eGFR for both -Americans and



   non- Americans.****



   The National Kidney Disease Education Program (NKDEP) does



   not endorse the use of the MDRD equation for patients that



   are not between the ages of 18 and 70, are pregnant, have



   extremes of body size, muscle mass, or nutritional status,



   or are non- or non-.



   According to the National Kidney Foundation, irrespective of



   diagnosis, the stage of the disease is based on the level of



   kidney function:



   Stage Description                      GFR(mL/min/1.73 m(2))



   1     Kidney damage with normal or decreased GFR       90



   2     Kidney damage with mild decrease in GFR          60-89



   3     Moderate decrease in GFR                         30-59



   4     Severe decrease in GFR                           15-29



   5     Kidney failure                       <15 (or dialysis)

 

 26  Please check today

 

 27  Test Performed by:



   [place] [address]



   : Jeremiah Hernandez II, M.D., Ph.D.

 

 28  Normal Range 180 to 914



   Indeterminate Range 145 to 180



   Deficient Range  <145

 

 29  Please check today

 

 30  Please check today

 

 31  -------------------ADDITIONAL INFORMATION-------------------



   This test was developed and its performance characteristics



   determined by [facility] in a manner consistent with CLIA



   requirements. This test has not been cleared or approved by



   the U.[place]. Food and Drug Administration.



   Test Performed by:



   [facility] - [address] [address]



   : Jeremiah Hernandez II, M.D., Ph.D.







Procedures







 Description

 

 No Information







Encounters







 Type  Date  Location  Provider  CPT E/M  Dx

 

 Office Visit  2018  Orthopedic Services  Haley Landry M.D.  [id number]  
M25.461



   2:00p  [facility]      









 R70.0

 

 L40.50

 

 M17.11

 

 M25.561

 

 L03.115

 

 E87.6









 Office Visit  2018  4:00p  R[facility]ology Services  German Cox,  [id number]  
L40.50



     Of [facility]  M.D.    









 Z79.899

 

 R70.0

 

 D64.9

 

 Z23









 Office Visit  2018 11:00a  Rheumatology Services  German Cox,  [id number]  
L40.50



     Of Jose REBOLLEDO    









 Z79.1

 

 Z79.899

 

 I73.00

 

 R70.0









 Office Visit  2017  9:20a  [facility] Services Of  German Bajwar,  
[id number]  M15.0



     Jose SANCHEZ.NIKI    









 Z79.1

 

 I73.00

 

 M79.1

 

 D64.9

 

 R70.0









 Office Visit  2017  9:20a  [facility] Of  German Cox,  
[id number]  M06.4



     Jose SANCHEZ.NIKI    









 R20.8

 

 M79.1

 

 M54.5

 

 I73.00

 

 Z79.1









 Office Visit  2017  8:00a  [facility] Of  German Bajwar,  
[id number]  M06.4



     Jose SANCHEZ.NIKI    









 R20.8

 

 M79.1

 

 M54.5

 

 M25.559

 

 I73.00

 

 Z79.1







Plan of Care

Future Appointment(s):2018  3:30 pm - Haley Landry M.D. at [facility]2018 10:20 am - German Cox M.D. at [facility] 
[facility] Of [facility]2018 - Haley Landry M.D.M25.461 Effusion, right kneeNew 
Labs:Lyme Disease SerologyUric AcidTick-Borne Panel PCR TcflsH86.0 Elevated 
erythrocyte sedimentation rateNew Labs:Lyme Disease XstvxjfgL03.50 Arthropathic 
psoriasis, sogmoctjpgiY27.11 Unilateral primary osteoarthritis, right 
kneeFollow up:Follow up:    last nzkmtugenzdY32.561 Pain in right kneeNew 
Medication:Meloxicam 15 mgHydrocodone Bitartrate/Acetaminophen 5-300 mgNew Xrays
:Knee 3 Views RTL03.115 Cellulitis of right lower limbNew Medication:Cephalexin 
500 mgE87.6 HypokalemiaNew Medication:Potassium Chloride ER 20 Meq

## 2018-07-28 NOTE — ED
Lower Extremity





- HPI Summary


HPI Summary: 


This is tony Berrysain documenting for attending Dr. Niko Penaloza MD.








A 47 y/o male presents to ED c/o right knee swelling and pain reaching 5/10 in 

severity. Currently, the patient has no other symptoms or medical conditions 

besides the knee swelling and pain. According to the patient, all of a sudden 

he woke with swelling and knee pain on Monday, 07/23/2018, after work this 

earlier in the morning. He noted that since then the pain and swelling has been 

getting worse. He noted that he was referred to [facility] ED by his rheumatologist, 

Dr. Landry, who called Dr. Edge earlier. Dr. Landry thinks the patient has 

cellulitis (initially thought to be Lyme disease). He stated that he has been 

on three different kinds of antibiotics (Keflex first, currently on Clindamycin

) over the past week, however, none of them alleviated his pain/swelling. 

Additionally he noted that he did have during onset of pain/swelling such as 

fever, chills, nausea and headaches, however it has been resolved since meds. 

It was noted that the patient was here on Wednesday where Dr. Cox tried to 

aspirate him in which he had syncope. He stopped breathing and EMS was called 

and brought to [facility] ED for six hours. PMHx of psoriasis and HBP. Major surgeries 

include torn mesenteric artery (no surgery), 7 surgeries on right shoulder, 2 

on right knee (ACL and MCL), left ankle surgery, appendectomy, left wrist 

surgery, left knee surgery, vasectomy. 








- History of Current Complaint


Chief Complaint: EDExtremityLower


Stated Complaint: RT KNEE SWELLING


Time Seen by Provider: 07/28/18 15:06


Hx Obtained From: Patient


Mechanism Of Injury: Unknown


Onset of Pain: Immediate


Onset/Duration: Days - 6 days


Severity Initially: Moderate


Severity Currently: Moderate


Pain Intensity: 5


Pain Scale Used: 0-10 Numeric


Timing: Constant


Location: Is Discrete @ - Right knee


Associated Signs And Symptoms: Positive: Swelling, Redness, Knee Pain


Aggravating Factor(s): Nothing


Alleviating Factor(s): Nothing


Able to Bear Weight: Yes





- Allergies/Home Medications


Allergies/Adverse Reactions: 


 Allergies











Allergy/AdvReac Type Severity Reaction Status Date / Time


 


acetaminophen [From Tylenol] Allergy  GI Upset Verified 07/28/18 15:15


 


aspirin Allergy  Swelling Verified 07/28/18 15:15





   Of  





   Face,Lips,&  





   Throat  


 


ibuprofen AdvReac  GI Upset Verified 07/24/18 17:41


 


migraine medication Allergy Severe See Comment Uncoded 07/24/18 17:41











Home Medications: 


 Home Medications





Amlodipine Besylate 5 mg PO DAILY 07/28/18 [History Confirmed 07/28/18]


Clindamycin Cap 300 mg Cap(NF) 300 mg PO QID 07/28/18 [History Confirmed 07/28/ 18]


Doxy 100 100 mg PO BID 07/28/18 [History Confirmed 07/28/18]


Folic Acid 1 mg PO DAILY 07/28/18 [History Confirmed 07/28/18]


Klor Con ER TAB 10 MEQ* 20 meq PO BID 07/28/18 [History Confirmed 07/28/18]


Meloxicam 15 mg PO DAILY 07/28/18 [History Confirmed 07/28/18]


Trazodone HCl 50 mg PO PRN 07/28/18 [History]











PMH/Surg Hx/FS Hx/Imm Hx


Endocrine/Hematology History: 


   Denies: Hx Diabetes, Hx Thyroid Disease


Cardiovascular History: Reports: Hx Hypertension


   Denies: Hx Pacemaker/ICD


Respiratory History: 


   Denies: Hx Asthma, Hx Chronic Obstructive Pulmonary Disease (COPD)


GI History: 


   Denies: Hx Ulcer


 History: 


   Denies: Hx Dialysis, Hx Renal Disease


Sensory History: 


   Denies: Hx Hearing Aid


Neurological History: Reports: Hx CVA, Hx Migraine


Psychiatric History: Reports: Hx Anxiety


   Denies: Hx Panic Disorder





- Surgical History


Surgery Procedure, Year, and Place: APPENDECTOMY AT AGE 6.  RIGHT SHOULDER AT 

AGE 15 AND 41.  RIGHT KNEE AT AGE 24.  LEFT WRIST 2005.  LEFT KNEE SCOPE 2011.  

LEFT ANKLE 2007.  VASECTOMY 2006.  TONSILS AS CHILD


Infectious Disease History: No


Infectious Disease History: 


   Denies: Hx Hepatitis, Hx Human Immunodeficiency Virus (HIV), Traveled 

Outside the [place] in Last 30 Days





- Family History


Known Family History: 


   Negative: Cardiac Disease, Hypertension, Diabetes





- Social History


Alcohol Use: None


Substance Use Type: Reports: None


Hx Tobacco Use: No


Smoking Status (MU): Never Smoked Tobacco





Review of Systems


Positive: Fever - RESOLVED, Chills - RESOLVED


Positive: Nausea - RESOLVED


Positive: Edema - Right knee, Other - POSITIVE: Right knee pain


Positive: Headache - RESOLVED


All Other Systems Reviewed And Are Negative: Yes





Physical Exam





- Summary


Physical Exam Summary: 


VITAL SIGNS: Reviewed.


GENERAL: Patient is a well-developed and nourished male who is lying 

comfortable in the stretcher. Patient is not in any acute respiratory distress.


HEAD AND FACE: No signs of trauma. No ecchymosis, hematomas or skull 

depressions. No sinus tenderness.


EYES: PERRLA, EOMI x 2, No injected conjunctiva, no nystagmus.


EARS: Hearing grossly intact. Ear canals and tympanic membranes are within 

normal limits.


MOUTH: Oropharynx within normal limits.


NECK: Supple, trachea is midline, no adenopathy, no JVD, no carotid bruit, no c-

spine tenderness, neck with full ROM.


CHEST: Symmetric, no tenderness at palpation


LUNGS: Clear to auscultation bilaterally. No wheezing or crackles.


CVS: Regular rate and rhythm, S1 and S2 present, no murmurs or gallops 

appreciated.


ABDOMEN: Soft, non-tender. No signs of distention. No rebound no guarding, and 

no masses palpated. Bowel sounds are normal.


EXTREMITIES: Right knee swelling and surrounding erythema possibly due to 

infection or psoriasis. 


NEURO: Alert and oriented x 3. No acute neurological deficits. Speech is normal 

and follows commands.


SKIN: Dry and warm





Triage Information Reviewed: Yes


Vital Signs On Initial Exam: 


 Initial Vitals











Temp Pulse Resp BP Pulse Ox


 


 98.4 F   76   16   154/85   100 


 


 07/28/18 14:51  07/28/18 14:51  07/28/18 14:51  07/28/18 14:51  07/28/18 14:51











Vital Signs Reviewed: Yes





Diagnostics





- Vital Signs


 Vital Signs











  Temp Pulse Resp BP Pulse Ox


 


 07/28/18 14:51  98.4 F  76  16  154/85  100














- Laboratory


Result Diagrams: 


 07/28/18 15:23





 07/28/18 15:23


Lab Statement: Any lab studies that have been ordered have been reviewed, and 

results considered in the medical decision making process.





Lower Extremity Course/Dx





- Course


Assessment/Plan: This patient is a 48-year-old male who presents to the 

emergency department with a chief complaint of having a right knee pain.  

Patient was seen by Dr. Landry today and he was transferred to the emergency 

department for blood work and admission to the hospital.  The patient has been 

diagnosed with cellulitis failing to be on antibiotics.  Patient reports that 

he has taken doxycycline, Keflex and clindamycin.  However the symptoms have 

not improved therefore the patient was sent to the emergency department for 

further workup and management.  Blood work without a significant abnormality 

except for increased ESR and CRP.  As per plan from Dr. Landry and Dr. Edge 

the patient will be admitted to Dr. Edge services for for further workup and 

management.  I understand that Dr. Landry will be doing the need For this 

patient.  At this point the patient is hemoglobin after stable alert and 

oriented 3.





- Diagnoses


Differential Diagnosis/HQI/PQRI: Positive: Bursitis, Cellulitis, Septic 

Arthritis


Provider Diagnoses: 


 Infection of knee








- Physician Notifications


Discussed Care Of Patient With: Jeremiah Phillips


Time Discussed With Above Provider: 18:15


Instructed by Provider To: Other - Accepts for admission. NOTE: At 1522 Dr. Edge recommended work up and put medications such as Vancomycin, Doxycycline 

and Levaquin. Will admit patient.





Discharge





- Sign-Out/Discharge


Documenting (check all that apply): Patient Departure





- Discharge Plan


Condition: Stable


Disposition: ADMITTED TO [facility]


Referrals: 


Tobias Cooper MD [Primary Care Provider] - 





- Billing Disposition and Condition


Condition: STABLE


Disposition: Admitted to [facility]

## 2018-07-28 NOTE — PN
Progress Note





- Progress Note


Date of Service: 07/28/18


Note: 





H and P update


Pt seen and examined. Full note dictated previously in system. Tap resulted in 

low WBC count but + gram stain. 


Plan is for R knee arthroscopic irrigation and debridement.


Will continue IV abx post op.

## 2018-07-28 NOTE — XMS REPORT
Yousuf Wagoner

 Created on:2018



Patient:Yousuf Wagoner

Sex:Male

:1970

External Reference #:[id number]





Demographics







 Address  [address] [place] [address]

 

 Home Phone  [phone]

 

 Mobile Phone  [phone]

 

 Preferred Language  English

 

 Marital Status  Not  Or 

 

 Anabaptist Affiliation  Unknown

 

 Race  White

 

 Ethnic Group  Not  Or 









Author







 Organization  [facility]

 

 Address  [address] [place] [address] [address]



   [place] [address]

 

 Phone  [phone]









Support







 Name  Relationship  Address  Phone

 

 Kelin Wagoner  Unavailable  Unavailable  [phone]









Care Team Providers







 Name  Role  Phone

 

 Oral Horvath MD  Care Team Information   Unavailable

 

 Tobias Cooper MD  Primary Care Physician  Unavailable









Payers







 Type  Date  Identification Numbers  Payment Provider  Subscriber

 

 Commercial    Policy Number: [id number]  [employer]  Kelin Wagoner









 PayID: [id number]  [address] [address]









 [place] [address]







Problems







 Date  Description  Provider  Status

 

 Onset: 2018  Cellulitis of right lower limb  Haley Landry M.D.  Active

 

 Onset: 2018  Localized, primary osteoarthritis  Haely Landry M.D.  
Active

 

 Onset: 2018  Psoriasis with arthropathy  Haley Landry M.D.  Active







Family History







 Date  Family Member(s)  Problem(s)  Comments

 

   General  Arthritis, Osteo  

 

   General  Osteoarthritis  

 

   General  Brother with undifferentiated spondyloarthropathy  

 

   General  Hypertension  







Social History







 Type  Date  Description  Comments

 

 Lives With    Spouse  

 

 Occupation    [employer]  

 

 ETOH Use    Denies alcohol use  

 

 Smoking    Patient has never smoked  

 

 Smoking    chews tobacco  

 

 Exercise Type/Frequency    Exercises rarely  







Allergies, Adverse Reactions, Alerts







 Date  Description  Reaction  Status  Severity  Comments

 

 2017  Tylenol    active    heartburn/burning sensation

 

 2017  Aspirin    active    heartburn/burning sensation

 

 2018  Sulfa Antibiotics  rash  active    

 

 2018  Tape    active    







Medications







 Medication  Date  Status  Form  Strength  Qnty  SIG  Indications  Ordering



                 Provider

 

 Clindamycin  /  Active  Capsules  300mg  42cap  1 capsule po  M25.461  
Haley



 HCL          s  q6 hours x 7    jacob Landry M.D.

 

 Doxycycline  /  Active  Capsules  100mg  45cap  1 by mouth    



 Hyclate          s  twice a day,    darlin Cox M.D.



             Methotrexate    



             and Taltz    

 

 Meloxicam  /  Active  Tablets  15mg  30tab  1 by mouth  M25.561  Haley



   2018        s  every day    AMAURY Landry

 

 Hydrocodone  /  Active  Tablets  5-300mg  60tab  1-2 tablets  M25.561  
Haley



 Bitartrate/Ace  2018        s  q12 hours by    shawnee Landry            mouth as    M.D.



             needed pain    

 

 Potassium  /  Active  Tablets ER  20Meq  6tabs  1 tab po bid  E87.6  
Haley



 Chloride ER  2018          x 3 days    Frantz,



             **recheck    M.D.



             potassium    



             wtih pcp in 1    



             week    

 

 Taltz  /  Active  Solution  80mg/ml  8unit  160 mg (two    2018    Auto-Injec    s  80 mg    Brooke,



       t      injections)    MDeidraDDeidra



             at week 0,    



             followed by    



             80 mg at    



             weeks 2, 4,    



             6, 8, 10, and    



             12, then 80    



             mg every 4    



             weeks.    

 

 Methotrexate  /  Active  Tablets  2.5mg  30tab  take 5  R70.0  2018        s  capsules/tabl    Brooke,



             ets by mouth    M.DDeidra



             once weekly    

 

 Folic Acid  /  Active  Tablets  1mg  90tab  take one  R70.0  2018        s  capsule/table    Brooke,



             t daily by    MBALDEMAR



             mouth    

 

 Celebrex  /  Active  Capsules  200mg  90cap  take one  M06.4  2017        s  capsule/table    Brooke,



             t daily by    M.D.



             mouth as    



             needed for    



             pain    

 

 Nitro-bid  /  Active  Ointment  2%  30gm  apply small  I73.00  2017          amount to    xiang Cox of    M.D.



             digits as    



             needed for    



             attack of    



             raynaud's    

 

 D3-1000  /  Active  Tablets  1000Unit  90tab  take one  M06.4  2017        s  capsule/table    Brooke,



             t daily by    MBALDEMAR



             mouth    

 

 Venlafaxine  /  Active  Caps ER  150mg    1 by mouth    Unknown



 HCL ER  0000    24HR      every day    

 

 Pantoprazole  /  Active  Tablets DR  40mg    1 by mouth    Unknown



 Sodium  0000          every day    

 

 Losartan  /  Active  Tablets  100mg    1 by mouth    Unknown



 Potassium  0000          every day    

 

 Amlodipine  00/  Active  Tablets  5mg    1 by mouth    Unknown



 Besylate  0000          every day    

 

 Trazodone HCL  /  Active  Tablets  50mg    1 tablet at    Unknown



   0000          bedtime as    



             needed    

 

 Alprazolam  /  Active  Tablets  0.5mg        Unknown



   0000              

 

 Rizatriptan  /  Active  Tablets  10mg    prn    Unknown



 Benzoate  0000    Dispers          

 

 Aspir-81  /  Active  Tablets DR  81mg    1 by mouth    Unknown



   0000          every day    

 

                 

 

 Cephalexin  /  Hx  Tablets  500mg  30tab  1 by mouth  L03.115  Haley



   2018 -        s  four times a    Frantz,



   07/27/          day    M.D.



                 

 

 Clopidogrel  /  Hx  Tablets  75mg    1 by mouth    Unknown



 Bisulfate  0000 -          every day    



   2018              







Immunizations







 CPT Code  Status  Date  Vaccine  Reaction  Lot #

 

 [id number]  Given  2018  Pneumococcal Conjugate  no immediate reaction  [id number]



       Vaccine 13 Valent For  noted  



       Intramuscular Use    







Vital Signs







 Date  Vital  Result  Comment

 

 2018  Height  70 inches  5'10"









 Heart Rate  87 /min  

 

 BP Systolic  130 mmHg  

 

 BP Diastolic  86 mmHg  

 

 Respiratory Rate  20 /min  

 

 Body Temperature  98.2 F  

 

 Pain Level  3  









 2018  Height  70 inches  5'10"









 Weight  235.00 lb  

 

 Heart Rate  88 /min  

 

 BP Systolic  126 mmHg  

 

 BP Diastolic  84 mmHg  

 

 BMI (Body Mass Index)  33.7 kg/m2  









 2018  Height  70 inches  5'10"









 Heart Rate  97 /min  

 

 BP Systolic Sitting  124 mmHg  

 

 BP Diastolic Sitting  73 mmHg  

 

 Respiratory Rate  14 /min  

 

 Body Temperature  99.0 F  

 

 Pain Level  8  









 2018  Height  70 inches  5'10"









 Weight  239.12 lb  

 

 Heart Rate  73 /min  

 

 BP Systolic Sitting  130 mmHg  

 

 BP Diastolic Sitting  82 mmHg  

 

 Respiratory Rate  14 /min  

 

 Pain Level  5  

 

 BMI (Body Mass Index)  34.3 kg/m2  









 2018  Height  70 inches  5'10"









 Weight  239.00 lb  

 

 Heart Rate  84 /min  

 

 BP Systolic Sitting  120 mmHg  

 

 BP Diastolic Sitting  80 mmHg  

 

 Respiratory Rate  14 /min  

 

 Body Temperature  97.6 F  

 

 Pain Level  5  

 

 BMI (Body Mass Index)  34.3 kg/m2  









 2017  Height  70 inches  5'10"









 Weight  237.25 lb  

 

 Heart Rate  80 /min  

 

 BP Systolic Sitting  118 mmHg  

 

 BP Diastolic Sitting  78 mmHg  

 

 Respiratory Rate  14 /min  

 

 Pain Level  3  

 

 BMI (Body Mass Index)  34.0 kg/m2  









 2017  Height  70 inches  5'10"









 Weight  238.00 lb  

 

 Heart Rate  89 /min  

 

 BP Systolic Sitting  101 mmHg  

 

 BP Diastolic Sitting  73 mmHg  

 

 Respiratory Rate  14 /min  

 

 Body Temperature  97.1 F  

 

 Pain Level  4  

 

 BMI (Body Mass Index)  34.1 kg/m2  









 2017  Height  70 inches  5'10"









 Weight  242.00 lb  

 

 Heart Rate  68 /min  

 

 BP Systolic Sitting  122 mmHg  

 

 BP Diastolic Sitting  80 mmHg  

 

 Respiratory Rate  14 /min  

 

 Body Temperature  97.4 F  

 

 Pain Level  3  

 

 BMI (Body Mass Index)  34.7 kg/m2  







Results







 Test  Date  Test  Result  H/L  Range  Note

 

 Laboratory test finding  2018  Lyme Disease Serology  <pending>      









 Uric Acid  5.9 mg/dL    4.4-7.6  









 Quantiferon Gold TB  2018  QuantiFERON-Tb Gold Plus  Negative    
Negative  1









 TB1 Ag minus Nil Result  0 IU/mL      

 

 TB2 Ag minus Nil Result  0 IU/mL      

 

 TB Mitogen minus Nil Result  9.05 IU/mL      

 

 TB Nil Result  0.03 IU/mL      2









 Laboratory test finding  2018  C Reactive Protein  2.93 mg/L    <8.01  3

 

 Comp Metabolic Panel  2018  Sodium  138 mmol/L    135-145  









 Potassium  4.2 mmol/L    3.5-5.0  

 

 Chloride  104 mmol/L    101-111  

 

 Co2 Carbon Dioxide  29 mmol/L    22-32  

 

 Anion Gap  5 mmol/L    2-11  

 

 Glucose  103 mg/dL  High    

 

 Blood Urea Nitrogen  21 mg/dL    6-24  

 

 Creatinine  1.11 mg/dL    0.67-1.17  

 

 BUN/Creatinine Ratio  18.9    8-20  

 

 Calcium  9.2 mg/dL    8.6-10.3  

 

 Total Protein  6.7 g/dL    6.4-8.9  

 

 Albumin  4.3 g/dL    3.2-5.2  

 

 Globulin  2.4 g/dL    2-4  

 

 Albumin/Globulin Ratio  1.8    1-3  

 

 Total Bilirubin  0.40 mg/dL    0.2-1.0  

 

 Alkaline Phosphatase  76 U/L      

 

 Alt  16 U/L    7-52  

 

 Ast  15 U/L    13-39  

 

 Egfr Non-  70.4    >60  

 

 Egfr   85.2    >60  4









 Laboratory test finding  2018  Erythrocyte Sed Rate  7 mm/Hr    0-14  

 

 CBC Auto Diff  2018  White Blood Count  7.0 10^3/uL    3.5-10.8  









 Red Blood Count  4.95 10^6/uL    4.00-5.40  

 

 Hemoglobin  14.4 g/dL    14.0-18.0  

 

 Hematocrit  41 %  Low  42-52  

 

 Mean Corpuscular Volume  83 fL    80-94  

 

 Mean Corpuscular Hemoglobin  29 pg    27-31  

 

 Mean Corpuscular HGB Conc  35 g/dL    31-36  

 

 Red Cell Distribution Width  14 %    10.5-15  

 

 Platelet Count  212 10^3/uL    150-450  

 

 Mean Platelet Volume  7.5 um3    7.4-10.4  

 

 Abs Neutrophils  4.3 10^3/uL    1.5-7.7  

 

 Abs Lymphocytes  1.7 10^3/uL    1.0-4.8  

 

 Abs Monocytes  0.6 10^3/uL    0-0.8  

 

 Abs Eosinophils  0.2 10^3/uL    0-0.6  

 

 Abs Basophils  0.1 10^3/uL    0-0.2  

 

 Abs Nucleated RBC  0 10^3/uL      

 

 Granulocyte %  62.4 %    38-83  

 

 Lymphocyte %  25.0 %    25-47  

 

 Monocyte %  8.3 %  High  0-7  

 

 Eosinophil %  3.3 %    0-6  

 

 Basophil %  1.0 %    0-2  

 

 Nucleated Red Blood Cells %  0      









 Laboratory test finding  2018  Erythrocyte Sed Rate  10 mm/Hr    0-14  5









 C Reactive Protein  6.40 mg/L  High  < 5.00  6

 

 Vitamin D, 1,25 Dihydroxy  52 pg/mL    18-64  7









 Comp Metabolic Panel  2018  Sodium  138 mmol/L  Low  139-145  









 Potassium  3.9 mmol/L    3.5-5.0  

 

 Chloride  104 mmol/L    101-111  

 

 Co2 Carbon Dioxide  27 mmol/L    22-32  

 

 Anion Gap  7 mmol/L    2-11  

 

 Glucose  130 mg/dL  High    

 

 Blood Urea Nitrogen  22 mg/dL    6-24  

 

 Creatinine  1.01 mg/dL    0.67-1.17  

 

 BUN/Creatinine Ratio  21.8  High  8-20  

 

 Calcium  9.2 mg/dL    8.6-10.3  

 

 Total Protein  6.3 g/dL  Low  6.4-8.9  

 

 Albumin  4.0 g/dL    3.2-5.2  

 

 Globulin  2.3 g/dL    2-4  

 

 Albumin/Globulin Ratio  1.7    1-3  

 

 Total Bilirubin  0.40 mg/dL    0.2-1.0  

 

 Alkaline Phosphatase  96 U/L      

 

 Alt  17 U/L    7-52  

 

 Ast  18 U/L    13-39  

 

 Egfr Non-  78.5    >60  

 

 Egfr   101.0    >60  8









 CBC Auto Diff  2018  White Blood Count  5.6 10^3/uL    3.5-10.8  









 Red Blood Count  5.00 10^6/uL    4.0-5.4  

 

 Hemoglobin  13.9 g/dL  Low  14.0-18.0  

 

 Hematocrit  41 %  Low  42-52  

 

 Mean Corpuscular Volume  83 fL    80-94  

 

 Mean Corpuscular Hemoglobin  28 pg    27-31  

 

 Mean Corpuscular HGB Conc  34 g/dL    31-36  

 

 Red Cell Distribution Width  14 %    10.5-15  

 

 Platelet Count  225 10^3/uL    150-450  

 

 Mean Platelet Volume  8.2 um3    7.4-10.4  

 

 Abs Neutrophils  3.6 10^3/uL    1.5-7.7  

 

 Abs Lymphocytes  1.4 10^3/uL    1.0-4.8  

 

 Abs Monocytes  0.3 10^3/uL    0-0.8  

 

 Abs Eosinophils  0.2 10^3/uL    0-0.6  

 

 Abs Basophils  0 10^3/uL    0-0.2  

 

 Abs Nucleated RBC  0 10^3/uL      

 

 Granulocyte %  64.5 %    38-83  

 

 Lymphocyte %  25.4 %    25-47  

 

 Monocyte %  5.8 %    0-7  

 

 Eosinophil %  3.7 %    0-6  

 

 Basophil %  0.6 %    0-2  

 

 Nucleated Red Blood Cells %  0.1      









 Comp Metabolic Panel  2017  Sodium  136 mmol/L    133-145  









 Potassium  4.7 mmol/L    3.5-5.0  

 

 Chloride  101 mmol/L    101-111  

 

 Co2 Carbon Dioxide  29 mmol/L    22-32  

 

 Anion Gap  6 mmol/L    2-11  

 

 Glucose  80 mg/dL      

 

 Blood Urea Nitrogen  17 mg/dL    6-24  

 

 Creatinine  1.15 mg/dL    0.67-1.17  

 

 BUN/Creatinine Ratio  14.8    8-20  

 

 Calcium  9.9 mg/dL    8.6-10.3  

 

 Total Protein  7.0 g/dL    6.4-8.9  

 

 Albumin  4.5 g/dL    3.2-5.2  

 

 Globulin  2.5 g/dL    2-4  

 

 Albumin/Globulin Ratio  1.8    1-3  

 

 Total Bilirubin  0.60 mg/dL    0.2-1.0  

 

 Alkaline Phosphatase  80 U/L      

 

 Alt  24 U/L    7-52  

 

 Ast  20 U/L    13-39  

 

 Egfr Non-  67.9    >60  

 

 Egfr   87.3    >60  9









 Laboratory test finding  2017  Erythrocyte Sed Rate  12 mm/Hr    0-14  10









 C Reactive Protein  3.36 mg/L    < 5.00  11









 Vitamin B12 And Folate Serum  2017  Vitamin B12  533 pg/mL    180-914  12









 Folic Acid (Folate)  7.97 ng/mL    >3.99  13









 Iron & Iron Binding Capacity  2017  Iron  97 g/dL      









 Unsaturated Iron Binding  292 g/dL      

 

 Total Iron Binding Capacity  389 g/dL    250-450  

 

 % Iron Saturation  25 %    15-55  









 CBC Auto Diff  2017  White Blood Count  5.0 10^3/uL    3.5-10.8  









 Red Blood Count  5.34 10^6/uL    4.0-5.4  

 

 Hemoglobin  14.1 g/dL    14.0-18.0  

 

 Hematocrit  43 %    42-52  

 

 Mean Corpuscular Volume  81 fL    80-94  

 

 Mean Corpuscular Hemoglobin  26 pg  Low  27-31  

 

 Mean Corpuscular HGB Conc  33 g/dL    31-36  

 

 Red Cell Distribution Width  15 %    10.5-15  

 

 Platelet Count  213 10^3/uL    150-450  

 

 Mean Platelet Volume  8 um3    7.4-10.4  

 

 Abs Neutrophils  3.1 10^3/uL    1.5-7.7  

 

 Abs Lymphocytes  1.4 10^3/uL    1.0-4.8  

 

 Abs Monocytes  0.4 10^3/uL    0-0.8  

 

 Abs Eosinophils  0.1 10^3/uL    0-0.6  

 

 Abs Basophils  0 10^3/uL    0-0.2  

 

 Abs Nucleated RBC  0.01 10^3/uL      

 

 Granulocyte %  61.5 %    38-83  

 

 Lymphocyte %  27.2 %    25-47  

 

 Monocyte %  8.9 %    1-9  

 

 Eosinophil %  1.8 %    0-6  

 

 Basophil %  0.6 %    0-2  

 

 Nucleated Red Blood Cells %  0.1      









 Connective Tissue Panel  2017  Anti-Nuclear Antibody  0.5 U      14









 Cyclic Citrullinated Peptide  <15.6 U      15

 

 Interpretation  See Comment      16









 Laboratory test finding  2017  Rheumatoid Factor  <15 IU/mL    <15  17









 Erythrocyte Sed Rate  23 mm/Hr  High  0-14  18

 

 C Reactive Protein  14.86 mg/L  High  < 5.00  19









 Hla B27  2017  Hla B27  Negative      20









 Hla B27 Interp  See Comment      21









 Laboratory test finding  2017  Lyme Disease Serology  Negative    
Negative  22

 

 Anca AB Ser If  2017  C-Anca  Negative    Negative  









 P-Anca  Negative    Negative  23









 Laboratory test finding  2017  Creatine Kinase(CK)  132 U/L      24

 

 Comp Metabolic Panel  2017  Sodium  139 mmol/L    133-145  









 Potassium  4.3 mmol/L    3.5-5.0  

 

 Chloride  103 mmol/L    101-111  

 

 Co2 Carbon Dioxide  30 mmol/L    22-32  

 

 Anion Gap  6 mmol/L    2-11  

 

 Glucose  99 mg/dL      

 

 Blood Urea Nitrogen  21 mg/dL    6-24  

 

 Creatinine  1.08 mg/dL    0.67-1.17  

 

 BUN/Creatinine Ratio  19.4    8-20  

 

 Calcium  9.4 mg/dL    8.6-10.3  

 

 Total Protein  7.0 g/dL    6.4-8.9  

 

 Albumin  4.2 g/dL    3.2-5.2  

 

 Globulin  2.8 g/dL    2-4  

 

 Albumin/Globulin Ratio  1.5    1-3  

 

 Total Bilirubin  0.40 mg/dL    0.2-1.0  

 

 Alkaline Phosphatase  84 U/L      

 

 Alt  18 U/L    7-52  

 

 Ast  14 U/L    13-39  

 

 Egfr Non-  73.3    >60  

 

 Egfr   94.3    >60  25









 CBC Auto Diff  2017  White Blood Count  5.5 10^3/uL    3.5-10.8  









 Red Blood Count  5.12 10^6/uL    4.0-5.4  

 

 Hemoglobin  13.4 g/dL  Low  14.0-18.0  

 

 Hematocrit  41 %  Low  42-52  

 

 Mean Corpuscular Volume  79 fL  Low  80-94  

 

 Mean Corpuscular Hemoglobin  26 pg  Low  27-31  

 

 Mean Corpuscular HGB Conc  33 g/dL    31-36  

 

 Red Cell Distribution Width  15 %    10.5-15  

 

 Platelet Count  226 10^3/uL    150-450  

 

 Mean Platelet Volume  8 um3    7.4-10.4  

 

 Abs Neutrophils  4.0 10^3/uL    1.5-7.7  

 

 Abs Lymphocytes  1.1 10^3/uL    1.0-4.8  

 

 Abs Monocytes  0.3 10^3/uL    0-0.8  

 

 Abs Eosinophils  0.1 10^3/uL    0-0.6  

 

 Abs Basophils  0 10^3/uL    0-0.2  

 

 Abs Nucleated RBC  0 10^3/uL      

 

 Granulocyte %  72.7 %    38-83  

 

 Lymphocyte %  19.8 %  Low  25-47  

 

 Monocyte %  5.3 %    1-9  

 

 Eosinophil %  1.8 %    0-6  

 

 Basophil %  0.4 %    0-2  

 

 Nucleated Red Blood Cells %  0.1      









 Laboratory test finding  2017  TSH (Thyroid Stim  1.00 mcIU/mL    0.34-
5.60  26



     Horm)        









 Angiotensin Converting Enzyme  27 U/L    8 - 53  27









 Vitamin B12 And Folate Serum  2017  Vitamin B12  531 pg/mL    180-914  28









 Folic Acid (Folate)  5.25 ng/mL    >3.99  29









 Vitamin D 1,25 And Vitamin  2017  Vitamin D Total 25(Oh)  29.6 ng/mL  
Low  30-50  30



 D,2            









 Vitamin D, 1,25 Dihydroxy  56 pg/mL    18-64  31









 1  No interferon-gamma response to M. tuberculosis antigens



   was detected. Infection with M. tuberculosis is unlikely.



   A single negative result does not exclude infection with



   M. tuberculosis. In patients at high risk for M.tuberculosis



   infection, a second test should be considered in accordance



   with the 2017 ATS/IDSA/CDC Clinical Practice Guidelines



   for Diagnosis of Tuberculosis in Adults and Children



   [Lewinsohn DM et. al. Clin. Infect. Dis.



   2017;64(2):111-115].

 

 2  Test Performed by:



   [facility]



   [address] [place] [id number]

 

 3  Please check this week

 

 4  *******Because ethnic data is not always readily available,



   this report includes an eGFR for both -Americans and



   non- Americans.****



   The National Kidney Disease Education Program (NKDEP) does



   not endorse the use of the MDRD equation for patients that



   are not between the ages of 18 and 70, are pregnant, have



   extremes of body size, muscle mass, or nutritional status,



   or are non- or non-.



   According to the National Kidney Foundation, irrespective of



   diagnosis, the stage of the disease is based on the level of



   kidney function:



   Stage Description                      GFR(mL/min/1.73 m(2))



   1     Kidney damage with normal or decreased GFR       90



   2     Kidney damage with mild decrease in GFR          60-89



   3     Moderate decrease in GFR                         30-59



   4     Severe decrease in GFR                           15-29



   5     Kidney failure                       <15 (or dialysis)

 

 5  Please check today

 

 6  Acute inflammation:  >10.00

 

 7  -------------------ADDITIONAL INFORMATION-------------------



   This test was developed and its performance characteristics



   determined by [facility] in a manner consistent with CLIA



   requirements. This test has not been cleared or approved by



   the U.S. Food and Drug Administration.



   Test Performed by:



   [facility]



   [address] [place] [address]

 

 8  *******Because ethnic data is not always readily available,



   this report includes an eGFR for both -Americans and



   non- Americans.****



   The National Kidney Disease Education Program (NKDEP) does



   not endorse the use of the MDRD equation for patients that



   are not between the ages of 18 and 70, are pregnant, have



   extremes of body size, muscle mass, or nutritional status,



   or are non- or non-.



   According to the National Kidney Foundation, irrespective of



   diagnosis, the stage of the disease is based on the level of



   kidney function:



   Stage Description                      GFR(mL/min/1.73 m(2))



   1     Kidney damage with normal or decreased GFR       90



   2     Kidney damage with mild decrease in GFR          60-89



   3     Moderate decrease in GFR                         30-59



   4     Severe decrease in GFR                           15-29



   5     Kidney failure                       <15 (or dialysis)

 

 9  *******Because ethnic data is not always readily available,



   this report includes an eGFR for both -Americans and



   non- Americans.****



   The National Kidney Disease Education Program (NKDEP) does



   not endorse the use of the MDRD equation for patients that



   are not between the ages of 18 and 70, are pregnant, have



   extremes of body size, muscle mass, or nutritional status,



   or are non- or non-.



   According to the National Kidney Foundation, irrespective of



   diagnosis, the stage of the disease is based on the level of



   kidney function:



   Stage Description                      GFR(mL/min/1.73 m(2))



   1     Kidney damage with normal or decreased GFR       90



   2     Kidney damage with mild decrease in GFR          60-89



   3     Moderate decrease in GFR                         30-59



   4     Severe decrease in GFR                           15-29



   5     Kidney failure                       <15 (or dialysis)

 

 10  Please check in 1 or 2 weeks or sooner if needed

 

 11  Acute inflammation:  >10.00

 

 12  Normal Range 180 to 914



   Indeterminate Range 145 to 180



   Deficient Range  <145

 

 13  Please check in 1 or 2 weeks or sooner if needed

 

 14  -------------------REFERENCE VALUE--------------------------



   <=1.0 (Negative)

 

 15  -------------------REFERENCE VALUE--------------------------



   <20.0 (Negative)

 

 16  Tests for antibodies to dsDNA and JULISA antigens are not



   performed automatically unless the ALISON result is > or=



   3.0 U.  Studies performed at [facility] indicate that



   positive ALISON results <3.0 U are rarely accompanied by



   positive second order tests.



   Test Performed by:



   [place] [id number]



   : Jeremiah Hernandez II, M.D., Ph.D.

 

 17  Test Performed by:



   [place] [address]



   : Jeremiah Hernandez II, M.D., Ph.D.

 

 18  Please check today

 

 19  Acute inflammation:  >10.00

 

 20  -------------------REFERENCE VALUE--------------------------



   Not Applicable

 

 21  RESULT: HLA-B27 antigen was not detected.



   -------------------ADDITIONAL INFORMATION-------------------



   Method: Flow Cytometry



   Performing Laboratory CLIA# [id number]



   Test Performed by:



   [place] [address]



   : Jeremiah Hernandez II, M.D., Ph.D.

 

 22  Serologic response to B. burgdorferi infection is not



   detected, but cannot rule out early infection during



   which low or undetectable antibody levels to



   B. burgdorferi may be present. If clinically indicated,



   a new serum specimen should be submitted in 7-14 days.



   Test Performed by:



   [facility]



   [address] [place] [address]



   : Jeremiah Hernandez II, M.D., Ph.D.

 

 23  Negative for cANCA and pANCA patterns by immunofluorescence.



   -------------------ADDITIONAL INFORMATION-------------------



   This test was developed and its performance characteristics



   determined by [facility] in a manner consistent with CLIA



   requirements. This test has not been cleared or approved by



   the U.S. Food and Drug Administration.



   Test Performed by:



   [facility] [place] [address]



   : Jeremiah Hernandez II, M.D., Ph.D.

 

 24  Please check today

 

 25  *******Because ethnic data is not always readily available,



   this report includes an eGFR for both -Americans and



   non- Americans.****



   The National Kidney Disease Education Program (NKDEP) does



   not endorse the use of the MDRD equation for patients that



   are not between the ages of 18 and 70, are pregnant, have



   extremes of body size, muscle mass, or nutritional status,



   or are non- or non-.



   According to the [employer], irrespective of



   diagnosis, the stage of the disease is based on the level of



   kidney function:



   Stage Description                      GFR(mL/min/1.73 m(2))



   1     Kidney damage with normal or decreased GFR       90



   2     Kidney damage with mild decrease in GFR          60-89



   3     Moderate decrease in GFR                         30-59



   4     Severe decrease in GFR                           15-29



   5     Kidney failure                       <15 (or dialysis)

 

 26  Please check today

 

 27  Test Performed by:



   [facility] [place] [address]



   : Jeremiah Hernandez II, M.D., Ph.D.

 

 28  Normal Range 180 to 914



   Indeterminate Range 145 to 180



   Deficient Range  <145

 

 29  Please check today

 

 30  Please check today

 

 31  -------------------ADDITIONAL INFORMATION-------------------



   This test was developed and its performance characteristics



   determined by [facility] in a manner consistent with CLIA



   requirements. This test has not been cleared or approved by



   the U.S. Food and Drug Administration.



   Test Performed by:



   [facility] [place] [address]



   : Jeremiah Hernandez II, M.D., Ph.D.







Procedures







 Description

 

 No Information







Encounters







 Type  Date  Location  Provider  CPT E/M  Dx

 

 Office Visit  2018  [facility]  German Cox M.D.  [id number]  
L40.50



   4:00p  Of [facility]      









 Z79.899

 

 R70.0

 

 D64.9

 

 Z23









 Office Visit  2018 11:00a  [facility]  German Cox  [id number]  
L40.50



     Of Jose SANCHEZ.NIKI    









 Z79.1

 

 Z79.899

 

 I73.00

 

 R70.0









 Office Visit  2017  9:20a  [facility]  German Cox,  
[address]  M15.0



     Jose SANCHEZ.NIKI    









 Z79.1

 

 I73.00

 

 M79.1

 

 D64.9

 

 R70.0









 Office Visit  2017  9:20a  [facility]  German Cox,  
[address]  M06.4



     Jose REBOLLEDO    









 R20.8

 

 M79.1

 

 M54.5

 

 I73.00

 

 Z79.1









 Office Visit  2017  8:00a  [facility]  German Cox,  
[address]  M06.4



     Jose REBOLLEDO    









 R20.8

 

 M79.1

 

 M54.5

 

 M25.559

 

 I73.00

 

 Z79.1







Plan of Care

Future Appointment(s):2018  3:00 pm - Haley Landry M.D. at [facility]2018 10:20 am - German Cox M.D. at [facility]2018 - Haley Landry M.D.M25.461 Effusion, right kneeNew 
Medication:Clindamycin  mgFollow up:Follow up:   L03.115 
Cellulitis of right lower limbE87.6 CoqglxiafgiK91.11 Unilateral primary 
osteoarthritis, right kneeL40.50 Arthropathic psoriasis, unspecified

## 2018-07-29 LAB
BASOPHILS # BLD AUTO: 0 10^3/UL (ref 0–0.2)
EOSINOPHIL # BLD AUTO: 0.3 10^3/UL (ref 0–0.6)
HCT VFR BLD AUTO: 36 % (ref 42–52)
HGB BLD-MCNC: 12.3 G/DL (ref 14–18)
LYMPHOCYTES # BLD AUTO: 1.2 10^3/UL (ref 1–4.8)
MCH RBC QN AUTO: 29 PG (ref 27–31)
MCHC RBC AUTO-ENTMCNC: 34 G/DL (ref 31–36)
MCV RBC AUTO: 84 FL (ref 80–94)
MONOCYTES # BLD AUTO: 0.4 10^3/UL (ref 0–0.8)
NEUTROPHILS # BLD AUTO: 4.5 10^3/UL (ref 1.5–7.7)
NRBC # BLD AUTO: 0 10^3/UL
NRBC BLD QL AUTO: 0.1
PLATELET # BLD AUTO: 214 10^3/UL (ref 150–450)
RBC # BLD AUTO: 4.23 10^6/UL (ref 4–5.4)
WBC # BLD AUTO: 6.4 10^3/UL (ref 3.5–10.8)

## 2018-07-29 RX ADMIN — AMLODIPINE BESYLATE SCH MG: 5 TABLET ORAL at 08:53

## 2018-07-29 RX ADMIN — VANCOMYCIN HYDROCHLORIDE SCH MLS/HR: 1 INJECTION, POWDER, LYOPHILIZED, FOR SOLUTION INTRAVENOUS at 07:35

## 2018-07-29 RX ADMIN — HEPARIN SODIUM SCH UNITS: 5000 INJECTION INTRAVENOUS; SUBCUTANEOUS at 14:03

## 2018-07-29 RX ADMIN — HEPARIN SODIUM SCH UNITS: 5000 INJECTION INTRAVENOUS; SUBCUTANEOUS at 21:44

## 2018-07-29 RX ADMIN — VANCOMYCIN HYDROCHLORIDE SCH MLS/HR: 1 INJECTION, POWDER, LYOPHILIZED, FOR SOLUTION INTRAVENOUS at 01:30

## 2018-07-29 RX ADMIN — HYDROMORPHONE HYDROCHLORIDE PRN MG: 1 INJECTION, SOLUTION INTRAMUSCULAR; INTRAVENOUS; SUBCUTANEOUS at 14:44

## 2018-07-29 RX ADMIN — HEPARIN SODIUM SCH UNITS: 5000 INJECTION INTRAVENOUS; SUBCUTANEOUS at 05:54

## 2018-07-29 RX ADMIN — VENLAFAXINE HYDROCHLORIDE SCH MG: 75 CAPSULE, EXTENDED RELEASE ORAL at 08:54

## 2018-07-29 RX ADMIN — OXYCODONE HYDROCHLORIDE AND ACETAMINOPHEN PRN TAB: 5; 325 TABLET ORAL at 21:55

## 2018-07-29 RX ADMIN — VANCOMYCIN HYDROCHLORIDE SCH MLS/HR: 1 INJECTION, POWDER, LYOPHILIZED, FOR SOLUTION INTRAVENOUS at 16:14

## 2018-07-29 RX ADMIN — HYDROMORPHONE HYDROCHLORIDE PRN MG: 1 INJECTION, SOLUTION INTRAMUSCULAR; INTRAVENOUS; SUBCUTANEOUS at 07:37

## 2018-07-29 RX ADMIN — VANCOMYCIN HYDROCHLORIDE SCH MLS/HR: 1 INJECTION, POWDER, LYOPHILIZED, FOR SOLUTION INTRAVENOUS at 23:57

## 2018-07-29 RX ADMIN — HYDROMORPHONE HYDROCHLORIDE PRN MG: 1 INJECTION, SOLUTION INTRAMUSCULAR; INTRAVENOUS; SUBCUTANEOUS at 03:43

## 2018-07-29 RX ADMIN — LOSARTAN POTASSIUM SCH MG: 25 TABLET, FILM COATED ORAL at 08:54

## 2018-07-29 RX ADMIN — FOLIC ACID SCH MG: 1 TABLET ORAL at 08:54

## 2018-07-29 NOTE — PN
Subjective


Date of Service: 07/29/18


Interval History: 








Right knee pain 4-5/10. Improved with diluadid


MRSA PCR positive on joint aspiration. GPC on GS. 


ID consult and PICC line ordered. 


last methotrexate (has been on for ~5 weeks) on wednesday 7/25, last ixekizumab 

2 weeks ago friday (both now stopped)








Objective


Active Medications: 








Amlodipine Besylate (Norvasc Tab*)  5 mg PO DAILY [facility]


   Last Admin: 07/29/18 08:53 Dose:  5 mg


Folic Acid (Folvite Tab*)  1 mg PO DAILY [facility]


   Last Admin: 07/29/18 08:54 Dose:  1 mg


Heparin Sodium (Porcine) (Heparin Vial(*))  5,000 units SUBCUT Q8HR [facility]


   Last Admin: 07/29/18 05:54 Dose:  5,000 units


Hydromorphone HCl (Dilaudid Inj*)  0.5 mg IV Q3H PRN


   PRN Reason: PAIN


   Last Admin: 07/29/18 07:37 Dose:  0.5 mg


Ceftriaxone Sodium 2 gm/ (Sodium Chloride)  100 mls @ 200 mls/hr IVPB Q24H [facility]


Vancomycin HCl 1,000 mg/ (Sodium Chloride)  250 mls @ 166.667 mls/hr IVPB Q8H 

[facility]


   Last Admin: 07/29/18 07:35 Dose:  166.667 mls/hr


Losartan Potassium (Cozaar Tab*)  100 mg PO DAILY [facility]


   Last Admin: 07/29/18 08:54 Dose:  100 mg


Ondansetron HCl (Zofran Inj*)  4 mg IV Q6H PRN


   PRN Reason: NAUSEA


Pharmacy Consult (Vancomycin Per Pharmacy*)  1 note FOLLOW UP . PRN


   PRN Reason: PER PROTOCOL


Pharmacy Profile Note (Vancomycin Trough Check)  1 note FOLLOW UP 0730 ONE


   Stop: 07/30/18 07:31


Venlafaxine HCl (Effexor Xr Cap*)  150 mg PO DAILY [facility]


   Last Admin: 07/29/18 08:54 Dose:  150 mg








 Vital Signs - 8 hr











  07/29/18 07/29/18 07/29/18





  02:29 03:38 03:43


 


Temperature 97.7 F 97.8 F 


 


Pulse Rate 68 63 


 


Respiratory 18 18 18





Rate   


 


Blood Pressure 120/66 110/61 





(mmHg)   


 


O2 Sat by Pulse 94 95 





Oximetry   














  07/29/18 07/29/18 07/29/18





  04:45 05:05 07:17


 


Temperature  97.8 F 97.8 F


 


Pulse Rate  51 54


 


Respiratory 16 16 14





Rate   


 


Blood Pressure  126/70 111/72





(mmHg)   


 


O2 Sat by Pulse  96 97





Oximetry   














  07/29/18 07/29/18





  07:37 08:54


 


Temperature  


 


Pulse Rate  


 


Respiratory 18 18





Rate  


 


Blood Pressure  





(mmHg)  


 


O2 Sat by Pulse  





Oximetry  











Oxygen Devices in Use Now: None


Appearance: NAD


Eyes: No Scleral Icterus, PERRLA


Ears/Nose/Mouth/Throat: NL Teeth, Lips, Gums, Mucous Membranes Moist


Neck: NL Appearance and Movements; NL JVP


Respiratory: Symmetrical Chest Expansion and Respiratory Effort, Clear to 

Auscultation


Cardiovascular: NL Sounds; No Murmurs; No JVD, RRR


Abdominal: NL Sounds; No Tenderness; No Distention, No Hepatosplenomegaly


Extremities: No Edema, - - right knee in immobilizer


Skin: No Rash or Ulcers


Neurological: Alert and Oriented x 3, NL Sensation, NL Muscle Strength and Tone


Nutrition: Taking PO's


Result Diagrams: 


 07/29/18 05:22





 07/29/18 05:22


Additional Lab and Data: 





 Laboratory Results - last 24 hr











  07/28/18 07/28/18 07/28/18





  15:23 15:23 15:23


 


WBC  5.4  


 


RBC  4.42  


 


Hgb  12.8 L  


 


Hct  37 L  


 


MCV  84  


 


MCH  29  


 


MCHC  34  


 


RDW  15  


 


Plt Count  234  


 


MPV  7.2 L  


 


Neut % (Auto)  63.4  


 


Lymph % (Auto)  21.9 L  


 


Mono % (Auto)  8.0 H  


 


Eos % (Auto)  5.6  


 


Baso % (Auto)  1.1  


 


Absolute Neuts (auto)  3.4  


 


Absolute Lymphs (auto)  1.2  


 


Absolute Monos (auto)  0.4  


 


Absolute Eos (auto)  0.3  


 


Absolute Basos (auto)  0.1  


 


Absolute Nucleated RBC  0  


 


Nucleated RBC %  0  


 


ESR  45 H  


 


Sodium   136 


 


Potassium   4.3 


 


Chloride   102 


 


Carbon Dioxide   24 


 


Anion Gap   10 


 


BUN   19 


 


Creatinine   0.93 


 


Est GFR ( Amer)   104.5 


 


Est GFR (Non-Af Amer)   86.4 


 


BUN/Creatinine Ratio   20.4 H 


 


Glucose   92 


 


Lactic Acid    1.7


 


Uric Acid   4.6 


 


Calcium   9.1 


 


Magnesium   


 


Total Bilirubin   0.50 


 


AST   26 


 


ALT   39 


 


Alkaline Phosphatase   79 


 


C-Reactive Protein   78.16 H 


 


Total Protein   6.9 


 


Albumin   4.0 


 


Globulin   2.9 


 


Albumin/Globulin Ratio   1.4 


 


Procalcitonin   


 


Urine Color   


 


Urine Appearance   


 


Urine pH   


 


Ur Specific Gravity   


 


Urine Protein   


 


Urine Ketones   


 


Urine Blood   


 


Urine Nitrate   


 


Urine Bilirubin   


 


Urine Urobilinogen   


 


Ur Leukocyte Esterase   


 


Urine WBC (Auto)   


 


Urine RBC (Auto)   


 


Urine Bacteria   


 


Urine Glucose   


 


Fluid Source   


 


Fluid Volume   


 


Fluid Color   


 


Fluid Appearance   


 


Fluid WBC   


 


Fluid RBC   


 


Fluid Tot Cell Count   


 


Fluid Neutrophils   


 


Fluid Lymphocytes   


 


Fluid Monocytes   


 


Fluid Crystals   














  07/28/18 07/28/18 07/28/18





  15:23 19:00 19:53


 


WBC   


 


RBC   


 


Hgb   


 


Hct   


 


MCV   


 


MCH   


 


MCHC   


 


RDW   


 


Plt Count   


 


MPV   


 


Neut % (Auto)   


 


Lymph % (Auto)   


 


Mono % (Auto)   


 


Eos % (Auto)   


 


Baso % (Auto)   


 


Absolute Neuts (auto)   


 


Absolute Lymphs (auto)   


 


Absolute Monos (auto)   


 


Absolute Eos (auto)   


 


Absolute Basos (auto)   


 


Absolute Nucleated RBC   


 


Nucleated RBC %   


 


ESR   


 


Sodium   


 


Potassium   


 


Chloride   


 


Carbon Dioxide   


 


Anion Gap   


 


BUN   


 


Creatinine   


 


Est GFR ( Amer)   


 


Est GFR (Non-Af Amer)   


 


BUN/Creatinine Ratio   


 


Glucose   


 


Lactic Acid   


 


Uric Acid   


 


Calcium   


 


Magnesium   


 


Total Bilirubin   


 


AST   


 


ALT   


 


Alkaline Phosphatase   


 


C-Reactive Protein   


 


Total Protein   


 


Albumin   


 


Globulin   


 


Albumin/Globulin Ratio   


 


Procalcitonin  0.1  


 


Urine Color   Straw 


 


Urine Appearance   Clear 


 


Urine pH   5.0 


 


Ur Specific Gravity   1.008 L 


 


Urine Protein   Negative 


 


Urine Ketones   Negative 


 


Urine Blood   1+ A 


 


Urine Nitrate   Negative 


 


Urine Bilirubin   Negative 


 


Urine Urobilinogen   Negative 


 


Ur Leukocyte Esterase   Negative 


 


Urine WBC (Auto)   Absent 


 


Urine RBC (Auto)   Absent 


 


Urine Bacteria   Absent 


 


Urine Glucose   Negative 


 


Fluid Source    Synovial fluid


 


Fluid Volume    3


 


Fluid Color    Red


 


Fluid Appearance    Bloody


 


Fluid WBC    50970


 


Fluid RBC    80045


 


Fluid Tot Cell Count    100


 


Fluid Neutrophils    87


 


Fluid Lymphocytes    5


 


Fluid Monocytes    8


 


Fluid Crystals   














  07/28/18 07/29/18 07/29/18





  22:06 05:22 05:22


 


WBC   6.4 


 


RBC   4.23 


 


Hgb   12.3 L 


 


Hct   36 L 


 


MCV   84 


 


MCH   29 


 


MCHC   34 


 


RDW   15 


 


Plt Count   214 


 


MPV   7.2 L 


 


Neut % (Auto)   70.2 


 


Lymph % (Auto)   18.7 L 


 


Mono % (Auto)   6.2 


 


Eos % (Auto)   4.2 


 


Baso % (Auto)   0.7 


 


Absolute Neuts (auto)   4.5 


 


Absolute Lymphs (auto)   1.2 


 


Absolute Monos (auto)   0.4 


 


Absolute Eos (auto)   0.3 


 


Absolute Basos (auto)   0 


 


Absolute Nucleated RBC   0 


 


Nucleated RBC %   0.1 


 


ESR   


 


Sodium    138


 


Potassium    5.1 H


 


Chloride    105


 


Carbon Dioxide    28


 


Anion Gap    5


 


BUN    20


 


Creatinine    1.07


 


Est GFR ( Amer)    88.9


 


Est GFR (Non-Af Amer)    73.5


 


BUN/Creatinine Ratio    18.7


 


Glucose    121 H


 


Lactic Acid   


 


Uric Acid   


 


Calcium    8.6


 


Magnesium    2.1


 


Total Bilirubin   


 


AST   


 


ALT   


 


Alkaline Phosphatase   


 


C-Reactive Protein   


 


Total Protein   


 


Albumin   


 


Globulin   


 


Albumin/Globulin Ratio   


 


Procalcitonin   


 


Urine Color   


 


Urine Appearance   


 


Urine pH   


 


Ur Specific Gravity   


 


Urine Protein   


 


Urine Ketones   


 


Urine Blood   


 


Urine Nitrate   


 


Urine Bilirubin   


 


Urine Urobilinogen   


 


Ur Leukocyte Esterase   


 


Urine WBC (Auto)   


 


Urine RBC (Auto)   


 


Urine Bacteria   


 


Urine Glucose   


 


Fluid Source   


 


Fluid Volume   


 


Fluid Color   


 


Fluid Appearance   


 


Fluid WBC   


 


Fluid RBC   


 


Fluid Tot Cell Count   


 


Fluid Neutrophils   


 


Fluid Lymphocytes   


 


Fluid Monocytes   


 


Fluid Crystals  None seen  














  07/29/18





  05:22


 


WBC 


 


RBC 


 


Hgb 


 


Hct 


 


MCV 


 


MCH 


 


MCHC 


 


RDW 


 


Plt Count 


 


MPV 


 


Neut % (Auto) 


 


Lymph % (Auto) 


 


Mono % (Auto) 


 


Eos % (Auto) 


 


Baso % (Auto) 


 


Absolute Neuts (auto) 


 


Absolute Lymphs (auto) 


 


Absolute Monos (auto) 


 


Absolute Eos (auto) 


 


Absolute Basos (auto) 


 


Absolute Nucleated RBC 


 


Nucleated RBC % 


 


ESR 


 


Sodium 


 


Potassium 


 


Chloride 


 


Carbon Dioxide 


 


Anion Gap 


 


BUN 


 


Creatinine 


 


Est GFR ( Amer) 


 


Est GFR (Non-Af Amer) 


 


BUN/Creatinine Ratio 


 


Glucose 


 


Lactic Acid 


 


Uric Acid 


 


Calcium 


 


Magnesium 


 


Total Bilirubin 


 


AST 


 


ALT 


 


Alkaline Phosphatase 


 


C-Reactive Protein 


 


Total Protein 


 


Albumin 


 


Globulin 


 


Albumin/Globulin Ratio 


 


Procalcitonin  < 0.1


 


Urine Color 


 


Urine Appearance 


 


Urine pH 


 


Ur Specific Gravity 


 


Urine Protein 


 


Urine Ketones 


 


Urine Blood 


 


Urine Nitrate 


 


Urine Bilirubin 


 


Urine Urobilinogen 


 


Ur Leukocyte Esterase 


 


Urine WBC (Auto) 


 


Urine RBC (Auto) 


 


Urine Bacteria 


 


Urine Glucose 


 


Fluid Source 


 


Fluid Volume 


 


Fluid Color 


 


Fluid Appearance 


 


Fluid WBC 


 


Fluid RBC 


 


Fluid Tot Cell Count 


 


Fluid Neutrophils 


 


Fluid Lymphocytes 


 


Fluid Monocytes 


 


Fluid Crystals 











Microbiology and Other Data: 








 Microbiology





07/29/18 00:00   Knee Right   Gram Stain - Final


07/28/18 19:53   Joint Fluid(Synovial) - Knee Right   Gram Stain - Final


07/28/18 19:53   Joint Fluid(Synovial) - Knee Right   Skin and Soft Tissue MRSA/

MSSA (PCR - Final


                            Mrsa Positive


                            S.aureus Positive




















Assess/Plan/Problems-Billing


Assessment: 





47 yo male PMH psoriatic arthritis (recently on methotrexate and IL17A 

inhibitor Ixekizumab, HTN p/w right knee swelling, pain. MRSA septic joint s/p 

aspiration and then arthroscopic irrigation with Dr. Martin 7/28. Vancomycin. 











- Patient Problems


(1) Septic joint of right knee joint


Current Visit: Yes   Status: Acute   Code(s): M00.9 - PYOGENIC ARTHRITIS, 

UNSPECIFIED   SNOMED Code(s): 849346114


   Comment: MRSA on PCR


s/p I&D and then arthroscopic irrigation


ID consult placed. PICC Line placed. Likely will need at least 2 weeks of IV 

vanc. f/u specificities. Pt is also relative acquired immunocompromised given 

recent methotrexate and Ixekizumab (IL17A inihibitor) use. 


continue ceftriaxone for now. 


   





(2) MRSA infection


Current Visit: Yes   Status: Acute   Code(s): A49.02 - METHICILLIN RESIS STAPH 

INFECTION, UNSP SITE   SNOMED Code(s): 844214947


   Comment: plan as above   





(3) HTN (hypertension)


Current Visit: Yes   Status: Acute   Code(s): I10 - ESSENTIAL (PRIMARY) 

HYPERTENSION   SNOMED Code(s): 90331375


   Comment: continue home losartan 100mg, amlodipine 5. BP controlled   





(4) Psoriatic arthritis


Current Visit: Yes   Status: Acute   Code(s): L40.50 - ARTHROPATHIC PSORIASIS, 

UNSPECIFIED   SNOMED Code(s): 490752992


   Comment: patient recently on methotrexate ~5 weeks and Ixekizumab (IL17A 

inhibitor) and therefore at increased risk for infections. 


Follows with Dr. Cox.    





(5) Immunocompromised state


Current Visit: Yes   Status: Acute   Code(s): D84.9 - IMMUNODEFICIENCY, 

UNSPECIFIED   SNOMED Code(s): 796987325


   


Status and Disposition: 





medicine will switch to inpatient. needs PICC, ID consult and setup of home 

vancomycin so can't leave today.

## 2018-07-29 NOTE — HP
CC:  Dr. Cooper *

 

ADMISSION HISTORY AND PHYSICAL:

 

DATE OF ADMISSION:  18

 

PRIMARY CARE PROVIDER:  Dr. Cooper.

 

MY ATTENDING PHYSICIAN WHILE IN THE HOSPITAL:  Dr. Simeon Beltrán.* (
DICTATED BY JONNATHAN MURILLO)

 

CHIEF COMPLAINT:  Redness and right knee pain for 5 days.

 

HISTORY OF PRESENT ILLNESS:  Mr. Wagoner is a 48-year-old male with past medical 
history significant for psoriatic arthritis affecting his spine, bilateral hips
, and knee; hypertension, migraines, and multiple knee surgeries, who presented 
to emergency department after being sent in from his orthopedist's office after 
5 days of redness and swelling on his knee.  The patient states that this began 
rather suddenly on Monday with large amount of swelling with associated chills 
and nausea, with mild shortness of breath, but without wheezing, chest pain, 
abdominal pain, diarrhea or other systemic symptoms.  The patient had a 
documented fever on Wednesday when he was seen by his orthopedist.  The patient 
initially went to see his rheumatologist on Monday and had an attempted 
arthrocentesis, which yielded no fluid, and was listed as a vasovagal response 
with suspected brief asystolic episode.  The patient was observed in the 
emergency department briefly after that and sent home.  The patient then 
followed up with his orthopedist on Wednesday who had concern for cellulitis, 
with mild concern for underlying septic arthritis.  The patient was initially 
started on doxycycline for concern for Lyme disease and had tick-borne panel 
sent, which was negative.  The patient was then started on Keflex for 
cellulitis and did not improve.  The patient was then started on clindamycin, 
which also did not improve his knee or the redness around his knee.  The 
patient was sent in from Dr. Haley Landry's office today.  The patient states 
that the pain in his left knee has been relatively constant, but it is worse 
with flexion and better with extension, though extension causes pain in his hip
, which is not new. The patient states that he has pain with walking, but is 
not incapacitated by the pain.  The patient states there is pain with any 
movement of the knee, but it is not severe.  The patient has had several 
episodes of tick bites, the most recent one being in December of last year.  
The patient had no erythema, migraines or rash.  The patient has a history of 
torn meniscus, torn MCL, and torn ACL, which were surgically repaired.  The 
patient was recently started on Taltz for control of his psoriatic arthritis.  
He has also been on methotrexate weekly.  This patient's Taltz and methotrexate 
were discontinued on Wednesday due to concern for infection. The patient, at 
that time, was started on meloxicam, which he states does not help with the 
pain in his leg.  The patient has had no adverse reactions to the antibiotics 
including abdominal pain or diarrhea.  We are asked to evaluate for admission 
due to concern for cellulitis, septic arthritis, and failed outpatient 
treatment with need for IV antibiotics.

 

PAST MEDICAL HISTORY:  Psoriasis with psoriatic arthritis, hypertension, GERD, 
Raynaud's phenomenon, migraines, anxiety; MCL, meniscus and ACL tears; insomnia
; mesenteric artery dissection, self resolved.

 

PAST SURGICAL HISTORY:  Total shoulder replacement, appendectomy, knee surgery 
with MCL, ACL and meniscus tear replacement.

 

MEDICATIONS:

1.  Folic acid 1 mg p.o. daily.

2.  Celecoxib 200 mg p.o. daily.

3.  Nitropaste 2% as needed for Raynaud's disease.

4.  D3 1000 units p.o. daily.

5.  Amlodipine 5 mg p.o. daily.

6.  Losartan 200 mg p.o. daily.

7.  Trazodone 50 mg p.o. nightly as needed.

8.  Venlafaxine 150 mg p.o. daily.

9.  Losartan 100 mg p.o. daily.

10.  Rizatriptan 10 mg p.o. twice weekly as needed.

11.  Meloxicam 15 mg p.o. daily.

 

Recently discontinued medications include:

1.  Taltz.

2.  Methotrexate.

 

ALLERGIES:  ASPIRIN, TYLENOL, SULFA.

 

FAMILY HISTORY:  The patient's mother is alive and has no health issues.  The 
patient's paternal grandmother had stomach cancer. The patient's father  of 
suicide and had agoraphobia.  The patient's brother and sister have no health 
problems.

 

SOCIAL HISTORY:  The patient does not smoke, drink or use illicit drug; the 
patient never has.  The patient used to be a  and now works for 
[employer].  The patient is  and has 3 healthy children.

 

REVIEW OF SYSTEMS:  A 14-point review of systems was reviewed and is negative, 
except as above in the history and physical.

 

                               PHYSICAL EXAMINATION

 

GENERAL:  The patient is a 48-year-old male, who appears stated age and sitting 
comfortably on the bed, in no acute distress.

 

VITAL SIGNS:  At the time of evaluation, temperature 99.1, pulse rate 68, 
respiratory rate 18, oxygen saturation 97% on room air, blood pressure 128/87.

 

HEENT:  Head is normocephalic, atraumatic.  Sclerae are anicteric.  No 
conjunctival injection.  Nasal mucosa moist.  Oral mucosa moist.  No pharyngeal 
erythema, discharge or exudate.

 

NECK:  Supple.  There is no lymphadenopathy.  No carotid bruit auscultated.  No 
JVD.

 

RESPIRATORY:  Clear to auscultation bilaterally.  No wheezes, rales or rhonchi. 
Good air exchange bilaterally.

 

HEART:  Regular rate and rhythm.  No clicks, murmurs, gallops or rubs.  Pulse 
is 2+ in the bilateral dorsalis pedis, posterior tibialis, and radial areas.  
Trace right lower extremity edema through the whole legs.  No bilateral calf 
tenderness noted.

 

ABDOMEN:  Soft, nontender, nondistended.  Bowel sounds are present and 
normoactive in all 4 quadrants.  No hepatosplenomegaly.  No abdominal bruits 
auscultated.  No hepatojugular reflux.

 

GENITOURINARY:  No suprapubic or CVA tenderness.

 

SKIN:  The patient has psoriasis on his knuckle and bilateral MCPs, as well as 
on his knees, small areas throughout.  The patient has petechial rash in his 
right lower extremity and a slight blanchable erythematous rash on the medial 
and proximal aspects of his right lower extremity.

 

MUSCULOSKELETAL:  The patient's left knee is swollen, tender, with positive 
ballottement.  Tenderness with passive flexion and extension throughout the 
range of motion, mild.  No clicking or laxity noted.

 

NEUROLOGIC:  Cranial nerves II through XII are intact.  No focal deficits.  
Alert and oriented x3.

 

PSYCHIATRIC:  Pleasant and cooperative.

 

 DIAGNOSTIC STUDIES/LAB DATA:  White blood cell count 5.4, hemoglobin 12.8, 
platelet count 234, ESR 45.  Sodium 136, potassium 4.3, chloride 102, carbon 
dioxide 24, anion gap 10, BUN 19, creatinine 0.93, glucose 92, lactic acid 1.7, 
uric acid 4.6, calcium 9.1, bilirubin 0.5, AST 26, ALT 39, alkaline phosphatase 
79, CRP 78.16. Protein 6.9, albumin 4.0, globulin 2.9.

 

Studies:  Recent knee x-ray from 18 read as significant right knee soft 
tissue swelling and moderate joint effusion, postsurgical change of ACL 
reconstruction, mild osteoarthritis, correlate with clinical presentation for 
potential signs of cellulitis and septic arthritis.

 

ASSESSMENT AND PLAN:  Mr. Wagoner is a 48-year-old who appears his stated age and 
is sitting comfortably on the bed, with a past medical history significant for 
psoriatic arthritis, hypertension, gastroesophageal reflux disease and multiple 
knee surgeries, who presents with 1 week of sudden onset redness, swelling and 
pain in his right knee.  The patient has followed with Rheumatology and 
Orthopedics and was referred to the hospital for failed outpatient treatment 
for cellulitis and possible septic arthritis.  The patient will be admitted 
hospital for IV antibiotics, blood cultures, supportive care and arthrocentesis
, as well as orthopedic consultation.

 

1. Right knee effusion, redness, rule out septic arthritis:  The differential 
for patient's knee pain and swelling includes septic arthritis, inflammatory 
arthritis, crystal arthritis; however, the patient's uric acid is normal.  The 
patient has had issues with psoriatic arthritis affecting his knee; however, 
this acute presentation is atypical.  The patient is able to walk on his knee 
and does not have excruciating pain nor systemic symptoms; however, his 
inflammatory markers are high.  The patient will be started on vancomycin and 
ceftriaxone for empiric coverage of both methicillin-resistant Staphylococcus 
aureus and gram-negative bacteria that might be causing septic arthritis.  The 
patient will have arthrocentesis with the Orthopedics tonight, with Dr. Martin.
  The patient will be monitored under anesthesia with Dr. Del Angel due to previous 
vasovagal reaction.  The patient's inflammatory markers will be trended on IV 
antibiotics and he will be transitioned to oral antibiotics as appropriate.  
Blood cultures are pending.  The patient does not meet sepsis criteria.

2.  Psoriasis:  The patient is currently on no disease-modifying antirheumatic 
drugs for psoriatic arthritis due to concern for infection; resume these as 
outpatient at the discretion of Rheumatology and Orthopedics.  Hold meloxicam 
while in the hospital due to history of gastroesophageal reflux disease and 
concern for upper gastrointestinal bleeding.

3.  Hypertension:  Continue lisinopril and amlodipine.

4.  FEN:  The patient will have regular unrestricted diet.  The patient has no 
indication for fluids.

5.  DVT prophylaxis:  The patient is a moderate risk and will have heparin 
subcu.

6.  Code status:  The patient would like to be a full code.  The patient would 
like his surrogate decision maker to be his wife, Tara Wagoner.

7.  Disposition:  The patient will be admitted for observation.

 

TIME SPENT:  Approximately 75 minutes were spent on this admission, 45 of which 
was spent face-to-face with the patient obtaining history and physical and 
discussing treatment plan.

 

The plan was discussed with my attending, Dr. Marcin Beltrán, and he is in 
agreement.

 

 ____________________________________ JONNATHAN MURILLO

 

[id number]/CPS #: [id number]

[facility]

## 2018-07-29 NOTE — CONS
CC:  Dr. Cox; Dr. Landry; PCP, Jt Reyes MD *

 

CONSULTATION REPORT:

 

DATE OF CONSULT:  07/28/18

 

ATTENDING PHYSICIAN:  Luz Maria Martin MD

 

CONSULTING PHYSICIAN:  JONNATHAN Gerber

 

CHIEF COMPLAINT:  Right knee swelling and possible cellulitis versus septic 
joint.

 

HISTORY OF PRESENT ILLNESS:  Briefly Yousuf Wagoner is a 48-year-old male who 
was seen by my partner Dr. Landry earlier this week as well as Dr. Cox, who 
is his rheumatologist.  He has a diagnosis of psoriatic arthritis and he has 
had right knee swelling and pain that has been going on just under 2 weeks.  He 
has history of an ACL reconstruction in the past as well as a meniscus tear.  
He several days ago developed some redness about the knee, was seen by Dr. Cox.  He tried to do an aspiration and the patient went vasovagal.  Labs 
were done, which demonstrated normal white count, but a mildly elevated ESR, 
CRP.  He was advised to undergo another aspiration, but declined on 2 occasions 
with Dr. Landry.  He was instructed that if his symptoms worsen and he needs to 
go to the ER if he started to have fevers or chills, pain with weight bearing 
or worsening symptoms, he needed to go. He was placed on at least 2 antibiotics 
one of which was Keflex, second one was clindamycin.  There was concern for 
worsening of the rash but his symptoms seemed to have improved.  Currently, he 
saying he is not in significant amount of pain and most of it is from the 
cellulitis which is extending down his legs.  He is able to bend and straighten 
his knee.  I watched him ambulate to the bathroom with very mild limp.  He has 
only had a one low grade temp to 99.1.  He denies any other fevers or chills, 
shortness of breath or any chest pain.  He is present with his wife and I have 
personally taken care of his daughter before.  He denies numbness or tingling.  
No fevers, chills.

 

PAST MEDICAL HISTORY:  Significant for psoriatic arthritis, high blood pressure
, hypercholesterolemia, anxiety and depression.

 

PAST SURGICAL HISTORY:  Left ankle surgery, right knee failed reconstruction, 3 
shoulder surgeries, one of which was a replacement, then a revision of the 
replacement and finally reverse, this was done by Dr. Oliver at [place], 
history of vasectomy.

 

MEDICATIONS:  Include:

1.  Taltz.

2.  Methotrexate.

3.  Folic acid.

4.  Celebrex.

5.  Nitro-Bid.

6.  D3.

7.  Venlafaxine.

8.  Pantoprazole.

9.  Losartan.

10.  Amlodipine.

11.  Trazodone.

12.  Alprazolam.

13.  Rizatriptan.

14.  Aspirin.

 

ALLERGIES:  TYLENOL, ASPIRIN, SULFA ANTIBIOTICS and TAPE.

 

FAMILY HISTORY:  Significant for maternal hypertension.  A brother with mixed 
variety of spondyloarthropathy as well as high blood pressure.

 

SOCIAL HISTORY:  He lives with his wife and children.  He works as [employer].  He chews tobacco.  He does not smoke, does not drink 
alcohol.  No recreational drug use.  His other hobbies include hunting.  He is 
right hand dominant.  He is a community ambulator.

 

PHYSICAL EXAM:  He is in no acute distress.  He is well-developed, well-
nourished. He is oriented x3.  He is pleasant mood with normal affect.  He is 
able to walk, has very slightly antalgic gait, but otherwise good balance and 
coordination. Examination of the right knee demonstrates an effusion, has 
palpable range of motions 0 to 130 degrees, stable to varus and valgus stress, 
tender about the medial joint line.  He has an area of cellulitis extending 
down his ankle laterally.  He has a lot of cellulitis.  No significant warmth 
is palpable to both sides.  His calf is soft and nontender.  He is sensate to 
light touch about the first dorsal webspace, medial, lateral, dorsal, and 
plantar foot.  He has 2+ PT pulse.  He has 5/5 strength in dorsiflexion and 
plantar flexion.

 

DIAGNOSTIC STUDIES/LAB DATA:  X-rays that were obtained previously that 
demonstrates, no fracture, dislocation.  Mild degenerative changes seen 
previously at ACL.

 

Labs obtained today, demonstrate a white count of 5.4.  Hematocrit of 37, 
platelet count of 234, ESR of 45, which is up from 22, which was about 4 days 
ago. Chemistries:  Sodium 136, potassium 4.3, chloride 102, carbon dioxide 24, 
BUN 19, creatinine 0.93, glucose 92.  Uric acid on 07/25/18 is 5.9.  CRP is 
78.16 which is up from 06/20/18 at 2.93, procalcitonin 0.1.  UA is negative.  
Labs looking for Lyme disease is negative for anaplasma, Babesia, Lyme disease 
Borrelia.  This is serology for 07/25/18.

 

ASSESSMENT AND PLAN:  He has right knee swelling and pain.  He has been seen by 
an orthopedic surgeon as well as rheumatologist multiple times.  He presented 
to the ER today with concerns for worsening symptoms.  He is currently being 
admitted to Medicine for suspected cellulitis.  He still has knee effusion and 
with some discomfort.  Although his symptoms relatively have improved quite a 
bit and he has good mobility of the knee with no evidence of pain with weight-
bearing, I think it is appropriate to aspirate his knee.  He is in a protective 
situation, where we can make him comfortable and try to address his issues if 
he has a vasovagal response again barring any kind of advance measures.  After 
discussion with the patient and his wife as well as Dr. Landry and Jeremiah Phillips
, the PA and the ED attending, we have decided to proceed with aspiration of 
the knee.  Because the patient has recent history of vasovagal response just to 
this knee aspiration and not to any other previous injection, it is appropriate 
we consulted Anesthesia to help us facilitate this.  The patient did just 
recently eat, he cannot have real conscious sedation, he will be given Versed 
as well as glycopyrrolate and will be monitored closely.

 

After the anesthesiologist was ready, the right knee was prepped and draped in 
usual sterile fashion and using the ED ultrasound, the effusion was identified 
under ultrasound guidance.  Key images were obtained.  An 18-gauge needle was 
used to aspirate the fluid.  The patient did tense during the procedure, but 
approximately 3 cc of fluid were removed.  This appeared to be bloody and 
slightly cloudy fluid.  He tolerated the injection well.  A bandage was applied.

 

We will send it for Gram stain, culture, cell count, Lyme and we will order 
this stat.  The patient will be n.p.o.  and we will monitor.  If there is any 
abnormality in the Gram stain or a very elevated white blood cell count, we 
will take him to the OR to undergo I and D of his knee.  He verbalized 
understanding. For now, he is admitted to Medicine and will be on IV 
antibiotics.  We will continue to monitor the patient.

 

Any questions were invited and answered.

 

 [id number] #: [id number]

[facility]

## 2018-07-29 NOTE — PN
Progress Note





- Progress Note


Date of Service: 07/29/18


SOAP: 


Subjective:


resting comfortably with complaints of right knee pain; controlled with current 

pain regimen








Objective:


 Vital Signs











Temp Pulse Resp BP Pulse Ox


 


 97.8 F   54   18   111/72   97 


 


 07/29/18 07:17  07/29/18 07:17  07/29/18 07:37  07/29/18 07:17  07/29/18 07:17








 Laboratory Last Values











WBC  6.4 10^3/ul (3.5-10.8)   07/29/18  05:22    


 


RBC  4.23 10^6/ul (4.00-5.40)   07/29/18  05:22    


 


Hgb  12.3 g/dl (14.0-18.0)  L  07/29/18  05:22    


 


Hct  36 % (42-52)  L  07/29/18  05:22    


 


MCV  84 fL (80-94)   07/29/18  05:22    


 


MCH  29 pg (27-31)   07/29/18  05:22    


 


MCHC  34 g/dl (31-36)   07/29/18  05:22    


 


RDW  15 % (10.5-15)   07/29/18  05:22    


 


Plt Count  214 10^3/ul (150-450)   07/29/18  05:22    


 


MPV  7.2 um3 (7.4-10.4)  L  07/29/18  05:22    


 


Neut % (Auto)  70.2 % (38-83)   07/29/18  05:22    


 


Lymph % (Auto)  18.7 % (25-47)  L  07/29/18  05:22    


 


Mono % (Auto)  6.2 % (0-7)   07/29/18  05:22    


 


Eos % (Auto)  4.2 % (0-6)   07/29/18  05:22    


 


Baso % (Auto)  0.7 % (0-2)   07/29/18  05:22    


 


Absolute Neuts (auto)  4.5 10^3/ul (1.5-7.7)   07/29/18  05:22    


 


Absolute Lymphs (auto)  1.2 10^3/ul (1.0-4.8)   07/29/18  05:22    


 


Absolute Monos (auto)  0.4 10^3/ul (0-0.8)   07/29/18  05:22    


 


Absolute Eos (auto)  0.3 10^3/ul (0-0.6)   07/29/18  05:22    


 


Absolute Basos (auto)  0 10^3/ul (0-0.2)   07/29/18  05:22    


 


Absolute Nucleated RBC  0 10^3/ul  07/29/18  05:22    


 


Nucleated RBC %  0.1   07/29/18  05:22    


 


ESR  45 mm/Hr (0-14)  H  07/28/18  15:23    


 


Sodium  138 mmol/L (135-145)   07/29/18  05:22    


 


Potassium  5.1 mmol/L (3.5-5.0)  H  07/29/18  05:22    


 


Chloride  105 mmol/L (101-111)   07/29/18  05:22    


 


Carbon Dioxide  28 mmol/L (22-32)   07/29/18  05:22    


 


Anion Gap  5 mmol/L (2-11)   07/29/18  05:22    


 


BUN  20 mg/dL (6-24)   07/29/18  05:22    


 


Creatinine  1.07 mg/dL (0.67-1.17)   07/29/18  05:22    


 


Est GFR ( Amer)  88.9  (>60)   07/29/18  05:22    


 


Est GFR (Non-Af Amer)  73.5  (>60)   07/29/18  05:22    


 


BUN/Creatinine Ratio  18.7  (8-20)   07/29/18  05:22    


 


Glucose  121 mg/dL ()  H  07/29/18  05:22    


 


Lactic Acid  1.7 mmol/L (0.5-2.0)   07/28/18  15:23    


 


Uric Acid  4.6 mg/dL (4.4-7.6)   07/28/18  15:23    


 


Calcium  8.6 mg/dL (8.6-10.3)   07/29/18  05:22    


 


Magnesium  2.1 mg/dL (1.9-2.7)   07/29/18  05:22    


 


Total Bilirubin  0.50 mg/dL (0.2-1.0)   07/28/18  15:23    


 


AST  26 U/L (13-39)   07/28/18  15:23    


 


ALT  39 U/L (7-52)   07/28/18  15:23    


 


Alkaline Phosphatase  79 U/L ()   07/28/18  15:23    


 


C-Reactive Protein  78.16 mg/L (<8.01)  H  07/28/18  15:23    


 


Total Protein  6.9 g/dL (6.4-8.9)   07/28/18  15:23    


 


Albumin  4.0 g/dL (3.2-5.2)   07/28/18  15:23    


 


Globulin  2.9 g/dL (2-4)   07/28/18  15:23    


 


Albumin/Globulin Ratio  1.4  (1-3)   07/28/18  15:23    


 


Procalcitonin  < 0.1 ng/mL (<0.6)   07/29/18  05:22    


 


Urine Color  Straw   07/28/18  19:00    


 


Urine Appearance  Clear   07/28/18  19:00    


 


Urine pH  5.0  (5-9)   07/28/18  19:00    


 


Ur Specific Gravity  1.008  (1.010-1.030)  L  07/28/18  19:00    


 


Urine Protein  Negative  (Negative)   07/28/18  19:00    


 


Urine Ketones  Negative  (Negative)   07/28/18  19:00    


 


Urine Blood  1+  (Negative)  A  07/28/18  19:00    


 


Urine Nitrate  Negative  (Negative)   07/28/18  19:00    


 


Urine Bilirubin  Negative  (Negative)   07/28/18  19:00    


 


Urine Urobilinogen  Negative  (Negative)   07/28/18  19:00    


 


Ur Leukocyte Esterase  Negative  (Negative)   07/28/18  19:00    


 


Urine WBC (Auto)  Absent  (Absent)   07/28/18  19:00    


 


Urine RBC (Auto)  Absent  (Absent)   07/28/18  19:00    


 


Urine Bacteria  Absent  (Absent)   07/28/18  19:00    


 


Urine Glucose  Negative  (Negative)   07/28/18  19:00    


 


Fluid Source  Synovial fluid   07/28/18  19:53    


 


Fluid Volume  3 mL  07/28/18  19:53    


 


Fluid Color  Red   07/28/18  19:53    


 


Fluid Appearance  Bloody   07/28/18  19:53    


 


Fluid WBC  84070 /mcL (0-726983)  07/28/18  19:53    


 


Fluid RBC  13171 /mcL  07/28/18  19:53    


 


Fluid Tot Cell Count  100   07/28/18  19:53    


 


Fluid Neutrophils  87 %  07/28/18  19:53    


 


Fluid Lymphocytes  5 %  07/28/18  19:53    


 


Fluid Monocytes  8 %  07/28/18  19:53    


 


Fluid Crystals  None seen  (None Seen)   07/28/18  22:06    








incision: c/d


PE: NVI








Assessment:


s/p I&D right knee








Plan:


1) continue IV Abx


2) Heparin/SCD's for DVT prophylaxis


3) PT/OT-WBAT

## 2018-07-29 NOTE — PN
Progress Note





- Progress Note


Date of Service: 07/29/18


Note: 





Pt seen and examined. Doing well. Pain controlled with IV. Able to ambulate. 

Family at bedside. No SOB, CP. No numbess or tingling.





 











Temp Pulse Resp BP Pulse Ox


 


 97.8 F   78   18   142/74   100 


 


 07/29/18 11:22  07/29/18 11:22  07/29/18 14:44  07/29/18 11:22  07/29/18 11:22








NAD. sitting in bed. right knee with dressing and cryocuff in place. SILT 

grossly distally. brisk cap refill. calf soft, nontender. ROM 2-120 degrees





 Laboratory Results - last 24 hr











  07/28/18 07/28/18 07/28/18





  15:23 15:23 15:23


 


WBC  5.4  


 


RBC  4.42  


 


Hgb  12.8 L  


 


Hct  37 L  


 


MCV  84  


 


MCH  29  


 


MCHC  34  


 


RDW  15  


 


Plt Count  234  


 


MPV  7.2 L  


 


Neut % (Auto)  63.4  


 


Lymph % (Auto)  21.9 L  


 


Mono % (Auto)  8.0 H  


 


Eos % (Auto)  5.6  


 


Baso % (Auto)  1.1  


 


Absolute Neuts (auto)  3.4  


 


Absolute Lymphs (auto)  1.2  


 


Absolute Monos (auto)  0.4  


 


Absolute Eos (auto)  0.3  


 


Absolute Basos (auto)  0.1  


 


Absolute Nucleated RBC  0  


 


Nucleated RBC %  0  


 


ESR  45 H  


 


Sodium   136 


 


Potassium   4.3 


 


Chloride   102 


 


Carbon Dioxide   24 


 


Anion Gap   10 


 


BUN   19 


 


Creatinine   0.93 


 


Est GFR ( Amer)   104.5 


 


Est GFR (Non-Af Amer)   86.4 


 


BUN/Creatinine Ratio   20.4 H 


 


Glucose   92 


 


Lactic Acid    1.7


 


Uric Acid   4.6 


 


Calcium   9.1 


 


Magnesium   


 


Total Bilirubin   0.50 


 


AST   26 


 


ALT   39 


 


Alkaline Phosphatase   79 


 


C-Reactive Protein   78.16 H 


 


Total Protein   6.9 


 


Albumin   4.0 


 


Globulin   2.9 


 


Albumin/Globulin Ratio   1.4 


 


Procalcitonin   


 


Urine Color   


 


Urine Appearance   


 


Urine pH   


 


Ur Specific Gravity   


 


Urine Protein   


 


Urine Ketones   


 


Urine Blood   


 


Urine Nitrate   


 


Urine Bilirubin   


 


Urine Urobilinogen   


 


Ur Leukocyte Esterase   


 


Urine WBC (Auto)   


 


Urine RBC (Auto)   


 


Urine Bacteria   


 


Urine Glucose   


 


Fluid Source   


 


Fluid Volume   


 


Fluid Color   


 


Fluid Appearance   


 


Fluid WBC   


 


Fluid RBC   


 


Fluid Tot Cell Count   


 


Fluid Neutrophils   


 


Fluid Lymphocytes   


 


Fluid Monocytes   


 


Fluid Crystals   














  07/28/18 07/28/18 07/28/18





  15:23 19:00 19:53


 


WBC   


 


RBC   


 


Hgb   


 


Hct   


 


MCV   


 


MCH   


 


MCHC   


 


RDW   


 


Plt Count   


 


MPV   


 


Neut % (Auto)   


 


Lymph % (Auto)   


 


Mono % (Auto)   


 


Eos % (Auto)   


 


Baso % (Auto)   


 


Absolute Neuts (auto)   


 


Absolute Lymphs (auto)   


 


Absolute Monos (auto)   


 


Absolute Eos (auto)   


 


Absolute Basos (auto)   


 


Absolute Nucleated RBC   


 


Nucleated RBC %   


 


ESR   


 


Sodium   


 


Potassium   


 


Chloride   


 


Carbon Dioxide   


 


Anion Gap   


 


BUN   


 


Creatinine   


 


Est GFR ( Amer)   


 


Est GFR (Non-Af Amer)   


 


BUN/Creatinine Ratio   


 


Glucose   


 


Lactic Acid   


 


Uric Acid   


 


Calcium   


 


Magnesium   


 


Total Bilirubin   


 


AST   


 


ALT   


 


Alkaline Phosphatase   


 


C-Reactive Protein   


 


Total Protein   


 


Albumin   


 


Globulin   


 


Albumin/Globulin Ratio   


 


Procalcitonin  0.1  


 


Urine Color   Straw 


 


Urine Appearance   Clear 


 


Urine pH   5.0 


 


Ur Specific Gravity   1.008 L 


 


Urine Protein   Negative 


 


Urine Ketones   Negative 


 


Urine Blood   1+ A 


 


Urine Nitrate   Negative 


 


Urine Bilirubin   Negative 


 


Urine Urobilinogen   Negative 


 


Ur Leukocyte Esterase   Negative 


 


Urine WBC (Auto)   Absent 


 


Urine RBC (Auto)   Absent 


 


Urine Bacteria   Absent 


 


Urine Glucose   Negative 


 


Fluid Source    Synovial fluid


 


Fluid Volume    3


 


Fluid Color    Red


 


Fluid Appearance    Bloody


 


Fluid WBC    95071


 


Fluid RBC    65164


 


Fluid Tot Cell Count    100


 


Fluid Neutrophils    87


 


Fluid Lymphocytes    5


 


Fluid Monocytes    8


 


Fluid Crystals   














  07/28/18 07/29/18 07/29/18





  22:06 05:22 05:22


 


WBC   6.4 


 


RBC   4.23 


 


Hgb   12.3 L 


 


Hct   36 L 


 


MCV   84 


 


MCH   29 


 


MCHC   34 


 


RDW   15 


 


Plt Count   214 


 


MPV   7.2 L 


 


Neut % (Auto)   70.2 


 


Lymph % (Auto)   18.7 L 


 


Mono % (Auto)   6.2 


 


Eos % (Auto)   4.2 


 


Baso % (Auto)   0.7 


 


Absolute Neuts (auto)   4.5 


 


Absolute Lymphs (auto)   1.2 


 


Absolute Monos (auto)   0.4 


 


Absolute Eos (auto)   0.3 


 


Absolute Basos (auto)   0 


 


Absolute Nucleated RBC   0 


 


Nucleated RBC %   0.1 


 


ESR   


 


Sodium    138


 


Potassium    5.1 H


 


Chloride    105


 


Carbon Dioxide    28


 


Anion Gap    5


 


BUN    20


 


Creatinine    1.07


 


Est GFR ( Amer)    88.9


 


Est GFR (Non-Af Amer)    73.5


 


BUN/Creatinine Ratio    18.7


 


Glucose    121 H


 


Lactic Acid   


 


Uric Acid   


 


Calcium    8.6


 


Magnesium    2.1


 


Total Bilirubin   


 


AST   


 


ALT   


 


Alkaline Phosphatase   


 


C-Reactive Protein   


 


Total Protein   


 


Albumin   


 


Globulin   


 


Albumin/Globulin Ratio   


 


Procalcitonin   


 


Urine Color   


 


Urine Appearance   


 


Urine pH   


 


Ur Specific Gravity   


 


Urine Protein   


 


Urine Ketones   


 


Urine Blood   


 


Urine Nitrate   


 


Urine Bilirubin   


 


Urine Urobilinogen   


 


Ur Leukocyte Esterase   


 


Urine WBC (Auto)   


 


Urine RBC (Auto)   


 


Urine Bacteria   


 


Urine Glucose   


 


Fluid Source   


 


Fluid Volume   


 


Fluid Color   


 


Fluid Appearance   


 


Fluid WBC   


 


Fluid RBC   


 


Fluid Tot Cell Count   


 


Fluid Neutrophils   


 


Fluid Lymphocytes   


 


Fluid Monocytes   


 


Fluid Crystals  None seen  














  07/29/18





  05:22


 


WBC 


 


RBC 


 


Hgb 


 


Hct 


 


MCV 


 


MCH 


 


MCHC 


 


RDW 


 


Plt Count 


 


MPV 


 


Neut % (Auto) 


 


Lymph % (Auto) 


 


Mono % (Auto) 


 


Eos % (Auto) 


 


Baso % (Auto) 


 


Absolute Neuts (auto) 


 


Absolute Lymphs (auto) 


 


Absolute Monos (auto) 


 


Absolute Eos (auto) 


 


Absolute Basos (auto) 


 


Absolute Nucleated RBC 


 


Nucleated RBC % 


 


ESR 


 


Sodium 


 


Potassium 


 


Chloride 


 


Carbon Dioxide 


 


Anion Gap 


 


BUN 


 


Creatinine 


 


Est GFR ( Amer) 


 


Est GFR (Non-Af Amer) 


 


BUN/Creatinine Ratio 


 


Glucose 


 


Lactic Acid 


 


Uric Acid 


 


Calcium 


 


Magnesium 


 


Total Bilirubin 


 


AST 


 


ALT 


 


Alkaline Phosphatase 


 


C-Reactive Protein 


 


Total Protein 


 


Albumin 


 


Globulin 


 


Albumin/Globulin Ratio 


 


Procalcitonin  < 0.1


 


Urine Color 


 


Urine Appearance 


 


Urine pH 


 


Ur Specific Gravity 


 


Urine Protein 


 


Urine Ketones 


 


Urine Blood 


 


Urine Nitrate 


 


Urine Bilirubin 


 


Urine Urobilinogen 


 


Ur Leukocyte Esterase 


 


Urine WBC (Auto) 


 


Urine RBC (Auto) 


 


Urine Bacteria 


 


Urine Glucose 


 


Fluid Source 


 


Fluid Volume 


 


Fluid Color 


 


Fluid Appearance 


 


Fluid WBC 


 


Fluid RBC 


 


Fluid Tot Cell Count 


 


Fluid Neutrophils 


 


Fluid Lymphocytes 


 


Fluid Monocytes 


 


Fluid Crystals 








 Microbiology











 07/28/18 15:23 Aerobic Blood Culture - Preliminary





 Blood Venous    No Growth Day 1





 Anaerobic Blood Culture - Preliminary





    No Growth Day 1


 


 07/29/18 00:00 Gram Stain - Final





 Knee Right 


 


 07/28/18 19:53 Gram Stain - Final





 Joint Fluid(Synovial) - Knee Right Skin and Soft Tissue MRSA/MSSA (PCR - Final





    Mrsa Positive





    S.aureus Positive








A/P 


POD 0 from R knee I and D for MSSA and MRSA joint infection


WBAT


ROM as cali


ice/elevate. 


IV abx. 


appreciate consult. will reevaluate on Monday and if needed Tuesday


possible second washout if symptoms worsen.


ID consulted. PICC line ordered. 


analgesia- transition to oral

## 2018-07-30 VITALS — SYSTOLIC BLOOD PRESSURE: 137 MMHG | DIASTOLIC BLOOD PRESSURE: 78 MMHG

## 2018-07-30 PROCEDURE — 05H533Z INSERTION OF INFUSION DEVICE INTO RIGHT SUBCLAVIAN VEIN, PERCUTANEOUS APPROACH: ICD-10-PCS | Performed by: HOSPITALIST

## 2018-07-30 PROCEDURE — 0S9C3ZX DRAINAGE OF RIGHT KNEE JOINT, PERCUTANEOUS APPROACH, DIAGNOSTIC: ICD-10-PCS | Performed by: ORTHOPAEDIC SURGERY

## 2018-07-30 RX ADMIN — HEPARIN SODIUM SCH UNITS: 5000 INJECTION INTRAVENOUS; SUBCUTANEOUS at 14:43

## 2018-07-30 RX ADMIN — OXYCODONE HYDROCHLORIDE AND ACETAMINOPHEN PRN TAB: 5; 325 TABLET ORAL at 07:46

## 2018-07-30 RX ADMIN — AMLODIPINE BESYLATE SCH MG: 5 TABLET ORAL at 09:09

## 2018-07-30 RX ADMIN — LOSARTAN POTASSIUM SCH MG: 25 TABLET, FILM COATED ORAL at 09:09

## 2018-07-30 RX ADMIN — VENLAFAXINE HYDROCHLORIDE SCH MG: 75 CAPSULE, EXTENDED RELEASE ORAL at 09:09

## 2018-07-30 RX ADMIN — HEPARIN SODIUM SCH UNITS: 5000 INJECTION INTRAVENOUS; SUBCUTANEOUS at 06:00

## 2018-07-30 RX ADMIN — FOLIC ACID SCH MG: 1 TABLET ORAL at 09:09

## 2018-07-30 RX ADMIN — VANCOMYCIN HYDROCHLORIDE SCH MLS/HR: 1 INJECTION, POWDER, LYOPHILIZED, FOR SOLUTION INTRAVENOUS at 07:45

## 2018-07-30 NOTE — PN
Progress Note





- Progress Note


Date of Service: 07/30/18


Note: 





Pt seen and examined. Doing well. awaiting transfusion of vanco and will go 

home. Min pain. Doing well. WB.





 











Temp Pulse Resp BP Pulse Ox


 


 97.7 F   65   16   137/78   98 


 


 07/30/18 15:30  07/30/18 15:30  07/30/18 15:30  07/30/18 15:30  07/30/18 15:30








NAD. RLE: dressing in place. able to flex and ext without pain. SILT grossly. 

calf soft.





 Laboratory Results - last 24 hr











  07/28/18 07/30/18





  19:53 07:16


 


BUN   17


 


Creatinine   1.02


 


Est GFR ( Amer)   93.9


 


Est GFR (Non-Af Amer)   77.6


 


Fluid Cell Count Rvw By   


 


Vancomycin Trough   11.6








Microbiology





07/28/18 16:06   Blood Venous   Aerobic Blood Culture - Preliminary


                            No Growth Day 2


07/28/18 16:06   Blood Venous   Anaerobic Blood Culture - Preliminary


                            No Growth Day 2


07/28/18 15:23   Blood Venous   Aerobic Blood Culture - Preliminary


                            No Growth Day 2


07/28/18 15:23   Blood Venous   Anaerobic Blood Culture - Preliminary


                            No Growth Day 2


07/28/18 19:53   Joint Fluid(Synovial) - Knee Right   Gram Stain - Final


07/28/18 19:53   Joint Fluid(Synovial) - Knee Right   Body Fluid Culture - 

Preliminary


                            Staphylococcus Aureus


07/28/18 19:53   Joint Fluid(Synovial) - Knee Right   Skin and Soft Tissue MRSA/

MSSA (PCR - Final


                            Mrsa Positive


                            S.aureus Positive


07/29/18 00:00   Knee Right   Gram Stain - Final


07/29/18 00:00   Knee Right   Wound Culture - Preliminary


                            No Growth Day 1


07/29/18 00:00   Body Fluid - Knee Right   Anaerobic Culture - Preliminary


                            No Growth Day 1





A/P


POD#1.5 from R knee I and D


Doing well


WBAT


IV abx


f/u thursday


d/c today

## 2018-07-30 NOTE — PN
Progress Note





- Progress Note


Date of Service: 07/30/18


SOAP: 


Subjective:


[Pt seen resting comfortably in bed. He states that he is a little angry as he 

has not gone home today and has still not received his PICC line today. He 

denies any f/c/ns, no n/v/d/c. He states he is feeling better than yesterday.   

]








Objective:


[General: Pt is alert and oriented x3. NAD.


MSK: R Knee: Dressing was wet from cryotherapy unit. Dressing was changed. 

incisions are clean, dry and intact with no drainage present. There is a mild 

effusion about the knee. He has painless ROM. Calves are soft and non tender. 

sensation is intact to light touch. 2+ DP pulse. ]





 Vital Signs











Temp  98.0 F   07/30/18 11:29


 


Pulse  58   07/30/18 11:29


 


Resp  16   07/30/18 11:29


 


BP  138/75   07/30/18 11:29


 


Pulse Ox  100   07/30/18 11:29








 Intake & Output











 07/29/18 07/30/18 07/30/18





 18:59 06:59 18:59


 


Intake Total 1377.8 1760 495


 


Output Total 0 1625 


 


Balance 1377.8 135 495


 


Intake:   


 


  IV Fluids 387.8 310 20


 


    ABX - CEFTRIAXONE 108  


 


    ABX - VANCOMYCIN 230 270 


 


    NS (0.9%) 49.8 40 20


 


  IVPB 490  250


 


    ABX - VANCOMYCIN 260  250


 


  Oral 500 1450 225


 


Output:   


 


  Urine 0 1625 


 


Other:   


 


  Estimated Void Medium Medium 


 


  Date of Last Bowel  7/28/2018 





  Movement   


 


  # Voids 2 8 














Assessment:


POD 0 from R knee I and D for MSSA and MRSA joint infection








Plan: 





WBAT


ROM as cali


ice/elevate. 


IV abx per Dr. Navarro


possible second washout if symptoms worsen.


 PICC line ordered. 


analgesia- transition to oral


Follow up with Dr. Martin on Thursday

## 2018-07-30 NOTE — CONS
CONSULTATION REPORT:

 

DATE OF CONSULT:  18

 

REQUESTING PHYSICIAN:  Dr. Pretty.

 

CONSULTING SERVICE:  Infectious Disease.

 

REASON FOR CONSULT:  Septic arthritis, right knee.

 

IMPRESSION:

1.  Methicillin-resistant Staphylococcus aureus septic arthritis, right knee, 
status post arthroscopic incision, debridement, ambulating almost pain free.

2.  Psoriatic arthritis, on Taltz.

 

RECOMMENDATIONS:  Vancomycin goal trough 15 to 20 for 4 weeks with weekly CBC, 
CMP, CRP, and vancomycin trough.  We discussed side effects including fever, 
rash, or diarrhea.  He will notify me of any and follow up with me in 1 to 2 
weeks.

 

HISTORY OF PRESENT ILLNESS:  A 48-year-old male with psoriatic arthritis 
admitted with right knee pain and swelling that came on fairly suddenly along 
with fevers, chills, and malaise.  Because of worsening pain, he came to the 
hospital on 18, the knee was aspirated for 10,000 white cells, 90,000 red 
cells.  The aspirate fluid was PCR positive for MRSA.  The culture from that 
specimen is pending.  Operative cultures on 18 are negative of 1 day.  
Today, his pain is much better, he has been walking around with significant 
improvement.  He does not have other joints bothering and thus does not feel 
like his usual psoriatic arthritis or anything he has experienced before.

 

PAST MEDICAL HISTORY:

1.  Psoriatic arthritis.

2.  Psoriasis.

3.  Hypertension.

4.  Gastroesophageal reflux disease.

5.  Raynaud's phenomenon.

6.  Migraines.

7.  Anxiety.

8.  History of MCL, meniscal, and ACL tears.

9.  Insomnia.

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

11.  Status post total shoulder arthroplasty.

12.  Status post appendectomy.

 

MEDICATIONS:

1.  Amlodipine.

2.  Folic acid.

3.  Heparin subcutaneous injection.

4.  Dilaudid as needed.

5.  Losartan.

6.  Vancomycin 1 g every 8 hours.

7.  Effexor.

 

ALLERGIES:  ASPIRIN, TYLENOL and SULFA.

 

FAMILY HISTORY:  Mother is alive and healthy.  Father  from suicide.

 

SOCIAL HISTORY:  He lives in [place].  There is no travel or sick contacts.  He 
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

 

REVIEW OF SYSTEMS:  A 14-point review of systems was negative except as noted 
above in history of present illness.

 

PHYSICAL EXAM:  Vital Signs:  Temperature is 37, heart rate 57, respiratory 
rate 18, blood pressure 137/88, oxygen saturation 97% on room air.  In general, 
he is awake, not in distress.  Neurologic:  He is oriented x3.  Follows all 
commands.  He moves all of his extremities. HEENT:  There is no thrush.  Neck 
is supple without mass.  Lymph Nodes:  There is no cervical, supraclavicular, 
inguinal, axillary, or epitrochlear lymphadenopathy.  Heart has regular rate 
and rhythm without murmurs, rubs, or gallops. Lungs are clear to auscultation 
bilaterally.  Abdomen:  Soft, nontender, nondistended.  There are bowel sounds 
present.  Skin:  There is no rash or splinter hemorrhage.  Musculoskeletal:  
There is no spine tenderness to palpation.  In the right knee, there is mild 
edema, no tenderness port incisions intact.

 

LABORATORY DATA:  White blood cell count 6, hemoglobin 12, platelets 214. 
Creatinine 1.  Procalcitonin 0.

 

Please see impressions and recommendations as outlined above.

 

Thanks for asking me to see Mr. Wagoner in consultation.

 

 [id number]/CPS #: [id number]

MTDD

## 2018-07-31 NOTE — OP
DATE OF OPERATION:  Night of 07/28/18 into 07/29/18 - ROOM #348

 

DATE OF BIRTH:  [birth year]

 

SURGEON:  Luz Maria Martin MD

 

ASSISTANT:  None available.

 

PRE-OP DIAGNOSIS:  Right knee septic arthritis.

 

POST-OP DIAGNOSES:  Right knee septic arthritis with medial and lateral 
meniscal tears.

 

OPERATIVE PROCEDURE:  Right knee arthroscopy with I and D, lavage, and partial 
medial and lateral meniscectomy.

 

INDICATIONS:  Yousuf Wagoner is a 48-year-old male with a history of psoriatic 
arthritis, on some immunosuppressive medications.  He has had several days' 
history of knee pain and swelling.  He was concerned for possible cellulitis 
and he was responding to the antibiotics; however, he returned to the ER with a 
swollen knee. He had some discomfort and mildly elevated inflammatory markers.  
After extensive discussion of the risks and benefits, he underwent an 
aspiration of his knee under ultrasound guidance, which demonstrated gram-
positive cocci, MRSA, and MSSA.

 

He underwent spinal anesthesia.  The choice to proceed with spinal anesthesia 
was because the patient had a large meal approximately 3 hours prior to surgery 
and it was discussed between two anesthesiologists whether spinal was safe 
given the patient is having local septic joint with some surrounding 
cellulitis.  Because he did not have cellulitis to the area of the back, it was 
determined that it was safe to do a spinal anesthesia.  This was again 
determined between two anesthesiologists.  Risks and benefits of surgery were 
discussed at length to include, but not limited to, bleeding; infection; damage 
to nerves, vessels, surrounding structures; wound nonhealing; persistent pain; 
need for further surgery; scarring; stiffness; incomplete relief of symptoms; 
risk of anesthesia; need for total joint later on; persistent infection; risk 
of osteomyelitis.

 

COMPLICATIONS:  None.

 

ESTIMATED BLOOD LOSS:  Minimal.

 

DESCRIPTION OF PROCEDURE:  The patient was greeted in the preoperative area by 
the attending surgeon.  Correct extremity was marked and consent was confirmed.
  He was then brought back to the operating room suite where he was placed in 
the supine position on the operating room table.  He then underwent spinal 
anesthesia, after which he was appropriately positioned in the bed.  The right 
leg was prepped and draped in the usual sterile fashion beginning with 
chlorhexidine soap, scrub, and alcohol wipe, and a final prep with ChloraPrep.

 

After appropriate surgical pause indicating site, side, procedure, consent was 
confirmed, the anterolateral portal was made using an 11-blade.  The scope was 
introduced into the joint and the joint was examined.  There were abundant 
synovitic changes.  There was no obvious leslie pus, but there was blood.  
Sample was attempted to be sent, but the sample was dropped.  The second sample 
was obtained, which had some fluid lavage in it and was sent to the lab for 
culture of Gram stain.  Knee was then lavaged.  There was abundant synovitis, 
particularly anteriorly.  It was hard to navigate into the suprapatellar pouch.
  Therefore, the anteromedial portal was made with an 18-gauge needle for 
localization.  The shaver was then used to debride back the abundant synovitis; 
it was anteromedially and laterally based.  This allowed mobilization of the 
kneecap and the scope entered to the suprapatellar pouch.  At this point, the 
diagnostic arthroscopy was done.  The patella had grade 0 changes.  The 
trochlea had a small ridge of grade 2 changes with some areas of grade 3 
changes.  The medial and lateral gutters were intact without any loose debris, 
but there was an abundant synovitis.  The soft tissue had to be removed to 
allow visualization at the ACL and PCL.  ACL had evidence of a previous 
reconstruction done in a transtibial fashion.  The graft appeared to be intact, 
but loose.  The medial compartment was examined.  There were some areas of 
grade 2 changes of the medial femoral condyle.  The medial plateau had grade 0 
to 1 changes.  The medial meniscus had an unstable flap that was present and 
flapping into the joint.  Decision was made to debride this.  Once the biters 
and malini were used to debride back the meniscus, attention was directed to 
the lateral compartment.  Lateral compartment had grade 1 changes, the meniscus 
was intact, except at the root, there was partial thickness tearing.  Decision 
was made to debride this back to a stable layer.  The remainder of the meniscus 
appropriately looked intact.  The meniscus was gently probed.  The knee was 
thoroughly lavaged with 12 L of sterile saline solution.  The final images were 
obtained.  The wounds were copiously irrigated with sterile saline.  The 
portals were closed with 3-0 nylon in interrupted fashion.  Sterile dressings 
were applied. A Cryo/Cuff was applied.  He was awoken from anesthesia and 
transferred to PACU in stable condition.

 

POSTOPERATIVE PLAN:  He will be weightbearing as tolerated, range of motion as 
tolerated.  He will be admitted for IV antibiotics.  ID will be consulted.  DVT 
prophylaxis will be heparin while he is in-house.  We will continue to follow 
the cultures.

 

 [id number]/CPS #: [id number]

[facility]

## 2018-08-01 LAB — SPECIMEN SOURCE: (no result)

## 2020-02-25 ENCOUNTER — HOSPITAL ENCOUNTER (OUTPATIENT)
Dept: HOSPITAL 25 - OR | Age: 50
Discharge: HOME | End: 2020-02-25
Attending: PLASTIC SURGERY
Payer: COMMERCIAL

## 2020-02-25 VITALS — SYSTOLIC BLOOD PRESSURE: 149 MMHG | DIASTOLIC BLOOD PRESSURE: 100 MMHG

## 2020-02-25 DIAGNOSIS — F41.8: ICD-10-CM

## 2020-02-25 DIAGNOSIS — L40.50: ICD-10-CM

## 2020-02-25 DIAGNOSIS — L92.8: Primary | ICD-10-CM

## 2020-02-25 DIAGNOSIS — I10: ICD-10-CM

## 2020-02-25 PROCEDURE — 88305 TISSUE EXAM BY PATHOLOGIST: CPT

## 2020-02-25 PROCEDURE — 88312 SPECIAL STAINS GROUP 1: CPT
